# Patient Record
Sex: FEMALE | Race: WHITE | NOT HISPANIC OR LATINO
[De-identification: names, ages, dates, MRNs, and addresses within clinical notes are randomized per-mention and may not be internally consistent; named-entity substitution may affect disease eponyms.]

---

## 2018-02-14 ENCOUNTER — RESULT REVIEW (OUTPATIENT)
Age: 58
End: 2018-02-14

## 2019-02-15 ENCOUNTER — RX RENEWAL (OUTPATIENT)
Age: 59
End: 2019-02-15

## 2019-03-06 ENCOUNTER — RESULT REVIEW (OUTPATIENT)
Age: 59
End: 2019-03-06

## 2019-03-26 ENCOUNTER — RECORD ABSTRACTING (OUTPATIENT)
Age: 59
End: 2019-03-26

## 2019-03-26 DIAGNOSIS — Z80.0 FAMILY HISTORY OF MALIGNANT NEOPLASM OF DIGESTIVE ORGANS: ICD-10-CM

## 2019-03-26 DIAGNOSIS — Z81.8 FAMILY HISTORY OF OTHER MENTAL AND BEHAVIORAL DISORDERS: ICD-10-CM

## 2019-03-26 DIAGNOSIS — Z80.3 FAMILY HISTORY OF MALIGNANT NEOPLASM OF BREAST: ICD-10-CM

## 2019-03-26 DIAGNOSIS — R73.03 PREDIABETES.: ICD-10-CM

## 2019-03-26 RX ORDER — CHLORHEXIDINE GLUCONATE 4 %
325 (65 FE) LIQUID (ML) TOPICAL DAILY
Refills: 0 | Status: ACTIVE | COMMUNITY

## 2019-06-10 ENCOUNTER — NON-APPOINTMENT (OUTPATIENT)
Age: 59
End: 2019-06-10

## 2019-06-10 ENCOUNTER — APPOINTMENT (OUTPATIENT)
Dept: INTERNAL MEDICINE | Facility: CLINIC | Age: 59
End: 2019-06-10
Payer: COMMERCIAL

## 2019-06-10 VITALS
WEIGHT: 162 LBS | BODY MASS INDEX: 30.58 KG/M2 | HEART RATE: 78 BPM | DIASTOLIC BLOOD PRESSURE: 80 MMHG | SYSTOLIC BLOOD PRESSURE: 120 MMHG | HEIGHT: 61 IN | OXYGEN SATURATION: 98 %

## 2019-06-10 PROCEDURE — 93000 ELECTROCARDIOGRAM COMPLETE: CPT

## 2019-06-10 PROCEDURE — 36415 COLL VENOUS BLD VENIPUNCTURE: CPT

## 2019-06-10 PROCEDURE — 99396 PREV VISIT EST AGE 40-64: CPT | Mod: 25

## 2019-06-12 NOTE — HEALTH RISK ASSESSMENT
[No falls in past year] : Patient reported no falls in the past year [0] : 1) Little interest or pleasure doing things: Not at all (0) [Very Good] : ~his/her~  mood as very good [] : No [MammogramDate] : 2019 [PapSmearDate] : 2019 [BoneDensityDate] : few years ago  [ColonoscopyDate] : cologuard

## 2019-06-12 NOTE — HISTORY OF PRESENT ILLNESS
[FreeTextEntry1] : annual  [de-identified] : patient presents to the office today for an annual exam. Patient recently lost her mom and is still grieving.

## 2019-06-17 LAB
25(OH)D3 SERPL-MCNC: 21.7 NG/ML
ALBUMIN SERPL ELPH-MCNC: 4.5 G/DL
ALP BLD-CCNC: 78 U/L
ALT SERPL-CCNC: 30 U/L
ANION GAP SERPL CALC-SCNC: 12 MMOL/L
AST SERPL-CCNC: 22 U/L
BASOPHILS # BLD AUTO: 0.05 K/UL
BASOPHILS NFR BLD AUTO: 0.9 %
BILIRUB SERPL-MCNC: <0.2 MG/DL
BUN SERPL-MCNC: 22 MG/DL
CALCIUM SERPL-MCNC: 9.4 MG/DL
CHLORIDE SERPL-SCNC: 99 MMOL/L
CHOLEST SERPL-MCNC: 215 MG/DL
CHOLEST/HDLC SERPL: 2.9 RATIO
CO2 SERPL-SCNC: 25 MMOL/L
CREAT SERPL-MCNC: 0.66 MG/DL
EOSINOPHIL # BLD AUTO: 0.13 K/UL
EOSINOPHIL NFR BLD AUTO: 2.3 %
ESTIMATED AVERAGE GLUCOSE: 108 MG/DL
GLUCOSE SERPL-MCNC: 87 MG/DL
HBA1C MFR BLD HPLC: 5.4 %
HCT VFR BLD CALC: 41.9 %
HDLC SERPL-MCNC: 74 MG/DL
HGB BLD-MCNC: 13.4 G/DL
IMM GRANULOCYTES NFR BLD AUTO: 0.4 %
LDLC SERPL CALC-MCNC: 123 MG/DL
LYMPHOCYTES # BLD AUTO: 1.03 K/UL
LYMPHOCYTES NFR BLD AUTO: 18.1 %
MAN DIFF?: NORMAL
MCHC RBC-ENTMCNC: 30 PG
MCHC RBC-ENTMCNC: 32 GM/DL
MCV RBC AUTO: 93.9 FL
MONOCYTES # BLD AUTO: 0.41 K/UL
MONOCYTES NFR BLD AUTO: 7.2 %
NEUTROPHILS # BLD AUTO: 4.06 K/UL
NEUTROPHILS NFR BLD AUTO: 71.1 %
OXCARBAZEPINE SERPL-MCNC: 23 UG/ML
PLATELET # BLD AUTO: 241 K/UL
POTASSIUM SERPL-SCNC: 5 MMOL/L
PROT SERPL-MCNC: 6.8 G/DL
RBC # BLD: 4.46 M/UL
RBC # FLD: 12.8 %
SODIUM SERPL-SCNC: 136 MMOL/L
T4 SERPL-MCNC: 5.7 UG/DL
TRIGL SERPL-MCNC: 92 MG/DL
TSH SERPL-ACNC: 0.87 UIU/ML
VIT B12 SERPL-MCNC: 1018 PG/ML
WBC # FLD AUTO: 5.7 K/UL

## 2020-06-17 ENCOUNTER — RESULT REVIEW (OUTPATIENT)
Age: 60
End: 2020-06-17

## 2020-06-19 ENCOUNTER — APPOINTMENT (OUTPATIENT)
Dept: ENDOCRINOLOGY | Facility: CLINIC | Age: 60
End: 2020-06-19
Payer: COMMERCIAL

## 2020-06-19 PROCEDURE — 99203 OFFICE O/P NEW LOW 30 MIN: CPT | Mod: 95

## 2020-06-19 NOTE — HISTORY OF PRESENT ILLNESS
[Home] : at home, [unfilled] , at the time of the visit. [Medical Office: ([facility])___] : at the medical office located in  [Verbal consent obtained from patient] : the patient, [unfilled] [FreeTextEntry1] : Jun 19, 2020    VideoChat  [employer]  Barrie\par \par PCP:  Dr. Milly Childers\par          Gyn:  Dr. Presley\par          GI:  at [facility]\par           Derm:  Samuel Behren for basal cell nose  - Dr. Alexandra   and Dr. Silva\par \par CC:  Multinodular thyroid - euthyroid:       6/2019    TSH 0.87 (on no Rx)\par         (bipolar - Dr. 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        \par \par \par Impression: History of multinodular thyroid, osteopenia.\par \par Plan: Updated US thyroid \par Dr. Presley requested BD.\par \par Call me one week after US.\par ROV 6 months.  \par \par Previous notes from [employer] appended below. \par \par Reason for Appointment \par 1. Euthyroid Multinodular goiter \par 2. Osteopenia \par  \par  \par History of Present Illness \par General:  \par        PMhx:\par        Left retinal hemorrhage with multiple surgeries, Bipolar disorder\par        Was seeing [facility] and found to have multinodular goiter. \par        2/11 US\par        many sub cm thyroid nodules BL. Larges in RUP 8mm and 5mm. 8mm nodule isoechoic with thin halo. Prior US in 2008 similar findings. \par        No CP, SOB, No neck pain, no swelling. Ex smoker. Feb 24,1996. No radiation exposure to neck. No abdominal pain. No Constipation or diarrhea. Hx of fibroids. No hair loss or brittle nails. \par        Family Hx of mother with Graves disease. Mothers cousin with thyroid cancer. Brother does not have thyroid issues\par        Osteopenia Hx: Vitamin D 600iu daily but no longer on. No recent falls or fractures. Oct 2009 Right wrist fracture. No hx of kidney stones. Menapause in early 50's. No hot flashes. \par        2012 Blood work\par        TSH 0.74\par        Vitamin D 27\par        Vitamin B12 860\par        TG 84, Chol 226, , HDL 70\par        CMP. K 4.6, Na 145, Cr 0.6, eGFR >60, LFTs nl. \par        Hgb 13.1, Hct 38. \par  \par Vital Signs \par Ht 62, Wt 166.8, /80, BMI 30.50. \par  \par Examination \par DMS Exam:\par        General Pleasant  F, NAD, Nontoxic. No evidence of abuse or neglect, Not at risk for fall. \par        Head Normocephalic and atraumatic. \par        Eyes PERRL/EOM intact, Conjunctiva and sclera clear without nystagmus. \par        Ears TM's intact and clear with normal canals with grossly normal hearing. \par        Nose No deformity, No discharge, No inflammation, No lesions. \par        Mouth No deformity or lesions with good dentition. \par        Neck No masses, thyromegaly or abnormal cervical codes. \par        Lungs CTA BL. \par        Heart S1S2 RRR no R/M/G. \par        Skin Intact without lesions or rashes. \par  \par  \par Assessments \par 1. Osteopenia - M85.80 (Primary) \par 2. Multinodular goiter - E04.2 \par 3. Vitamin D deficiency - E55.9 \par  \par Treatment \par 1. Osteopenia  \par      LAB: VITAMIN B12 AND FOLATE\par      LAB: CBC\par      LAB: COMPREHENSIVE METABOLIC PANEL\par      LAB: LIPID PANEL\par      LAB: MAGNESIUM\par      LAB: PHOSPHORUS INORGANIC\par      LAB: VITAMIN D 25-HYDROXY\par      LAB: PTH INTACT\par      IMAGING: DEXA\par Notes: Hx of Osteopenia in notes\par Will obtain DEXA scan. Last scan appears to have been in 2538-8637\par Will check CMP, Mag, Phos, PTH, Vitamin D, Vitamin B12 and Folic acid\par Once blood work returns will discuss Vitamin D and calcium replacement.    \par \par 2. Multinodular goiter  \par      LAB: FREE THYROXINE FT4\par      LAB: HEMOGLOBIN A1c\par      LAB: THYROPEROXIDASE AB, SERUM\par      LAB: TSH\par      LAB: THYROGLOBULIN ANTIBODY GROUP\par      IMAGING: US THYROID\par Notes: Multinodular goiter\par Last thyroid US as above\par Will repeat to evaluate nodules\par Will check TSH and Free T4\par Will check TPO and TG abs.    \par  \par  \par

## 2020-06-19 NOTE — ASSESSMENT
[FreeTextEntry1] : Multinodular thyroid - under surveillance \par \par She will go for updated US thyroid at [facility] and ROV to see me in December.\par

## 2020-07-03 ENCOUNTER — TRANSCRIPTION ENCOUNTER (OUTPATIENT)
Age: 60
End: 2020-07-03

## 2020-07-14 ENCOUNTER — NON-APPOINTMENT (OUTPATIENT)
Age: 60
End: 2020-07-14

## 2020-07-14 ENCOUNTER — APPOINTMENT (OUTPATIENT)
Dept: INTERNAL MEDICINE | Facility: CLINIC | Age: 60
End: 2020-07-14
Payer: COMMERCIAL

## 2020-07-14 VITALS
BODY MASS INDEX: 29.07 KG/M2 | DIASTOLIC BLOOD PRESSURE: 82 MMHG | SYSTOLIC BLOOD PRESSURE: 128 MMHG | HEART RATE: 72 BPM | OXYGEN SATURATION: 98 % | HEIGHT: 61 IN | WEIGHT: 154 LBS

## 2020-07-14 PROCEDURE — 99396 PREV VISIT EST AGE 40-64: CPT | Mod: 25

## 2020-07-14 PROCEDURE — 93000 ELECTROCARDIOGRAM COMPLETE: CPT | Mod: 59

## 2020-07-14 PROCEDURE — G0444 DEPRESSION SCREEN ANNUAL: CPT | Mod: NC,59

## 2020-07-14 PROCEDURE — 36415 COLL VENOUS BLD VENIPUNCTURE: CPT

## 2020-07-21 ENCOUNTER — RESULT REVIEW (OUTPATIENT)
Age: 60
End: 2020-07-21

## 2020-07-23 LAB
25(OH)D3 SERPL-MCNC: 42.5 NG/ML
ALBUMIN SERPL ELPH-MCNC: 5 G/DL
ALP BLD-CCNC: 94 U/L
ALT SERPL-CCNC: 22 U/L
ANION GAP SERPL CALC-SCNC: 13 MMOL/L
AST SERPL-CCNC: 18 U/L
BASOPHILS # BLD AUTO: 0.03 K/UL
BASOPHILS NFR BLD AUTO: 0.4 %
BILIRUB SERPL-MCNC: 0.3 MG/DL
BUN SERPL-MCNC: 13 MG/DL
CALCIUM SERPL-MCNC: 9.5 MG/DL
CHLORIDE SERPL-SCNC: 88 MMOL/L
CHOLEST SERPL-MCNC: 267 MG/DL
CHOLEST/HDLC SERPL: 2.9 RATIO
CO2 SERPL-SCNC: 25 MMOL/L
CREAT SERPL-MCNC: 0.59 MG/DL
EOSINOPHIL # BLD AUTO: 0.05 K/UL
EOSINOPHIL NFR BLD AUTO: 0.7 %
ESTIMATED AVERAGE GLUCOSE: 108 MG/DL
GLUCOSE SERPL-MCNC: 104 MG/DL
HBA1C MFR BLD HPLC: 5.4 %
HCT VFR BLD CALC: 44 %
HDLC SERPL-MCNC: 93 MG/DL
HGB BLD-MCNC: 14.2 G/DL
IMM GRANULOCYTES NFR BLD AUTO: 0.3 %
LDLC SERPL CALC-MCNC: 156 MG/DL
LYMPHOCYTES # BLD AUTO: 1.26 K/UL
LYMPHOCYTES NFR BLD AUTO: 18.9 %
MAN DIFF?: NORMAL
MCHC RBC-ENTMCNC: 30.1 PG
MCHC RBC-ENTMCNC: 32.3 GM/DL
MCV RBC AUTO: 93.2 FL
MONOCYTES # BLD AUTO: 0.48 K/UL
MONOCYTES NFR BLD AUTO: 7.2 %
NEUTROPHILS # BLD AUTO: 4.83 K/UL
NEUTROPHILS NFR BLD AUTO: 72.5 %
PLATELET # BLD AUTO: 296 K/UL
POTASSIUM SERPL-SCNC: 4.2 MMOL/L
PROT SERPL-MCNC: 7.6 G/DL
RBC # BLD: 4.72 M/UL
RBC # FLD: 12.4 %
SARS-COV-2 IGG SERPL IA-ACNC: <3.8 AU/ML
SARS-COV-2 IGG SERPL QL IA: NEGATIVE
SODIUM SERPL-SCNC: 126 MMOL/L
T4 SERPL-MCNC: 6.4 UG/DL
TRIGL SERPL-MCNC: 87 MG/DL
TSH SERPL-ACNC: 1.1 UIU/ML
VIT B12 SERPL-MCNC: 1143 PG/ML
WBC # FLD AUTO: 6.67 K/UL

## 2020-07-28 ENCOUNTER — APPOINTMENT (OUTPATIENT)
Dept: INTERNAL MEDICINE | Facility: CLINIC | Age: 60
End: 2020-07-28
Payer: COMMERCIAL

## 2020-07-28 PROCEDURE — 36415 COLL VENOUS BLD VENIPUNCTURE: CPT

## 2020-08-03 LAB
ANION GAP SERPL CALC-SCNC: 14 MMOL/L
BUN SERPL-MCNC: 17 MG/DL
CALCIUM SERPL-MCNC: 9.9 MG/DL
CHLORIDE SERPL-SCNC: 93 MMOL/L
CO2 SERPL-SCNC: 24 MMOL/L
CREAT SERPL-MCNC: 0.68 MG/DL
GLUCOSE SERPL-MCNC: 99 MG/DL
POTASSIUM SERPL-SCNC: 4.6 MMOL/L
SODIUM SERPL-SCNC: 131 MMOL/L

## 2020-08-13 ENCOUNTER — APPOINTMENT (OUTPATIENT)
Dept: GASTROENTEROLOGY | Facility: CLINIC | Age: 60
End: 2020-08-13

## 2020-08-17 ENCOUNTER — APPOINTMENT (OUTPATIENT)
Dept: INTERNAL MEDICINE | Facility: CLINIC | Age: 60
End: 2020-08-17
Payer: COMMERCIAL

## 2020-08-17 VITALS
SYSTOLIC BLOOD PRESSURE: 160 MMHG | BODY MASS INDEX: 29.07 KG/M2 | HEART RATE: 78 BPM | WEIGHT: 154 LBS | OXYGEN SATURATION: 98 % | DIASTOLIC BLOOD PRESSURE: 100 MMHG | HEIGHT: 61 IN

## 2020-08-17 PROCEDURE — 99214 OFFICE O/P EST MOD 30 MIN: CPT | Mod: 25

## 2020-08-17 PROCEDURE — 36415 COLL VENOUS BLD VENIPUNCTURE: CPT

## 2020-08-21 LAB
ANION GAP SERPL CALC-SCNC: 14 MMOL/L
BUN SERPL-MCNC: 16 MG/DL
CALCIUM SERPL-MCNC: 9.4 MG/DL
CHLORIDE SERPL-SCNC: 94 MMOL/L
CO2 SERPL-SCNC: 24 MMOL/L
CREAT SERPL-MCNC: 0.52 MG/DL
GLUCOSE SERPL-MCNC: 117 MG/DL
POTASSIUM SERPL-SCNC: 4.9 MMOL/L
SODIUM SERPL-SCNC: 132 MMOL/L

## 2020-08-25 ENCOUNTER — APPOINTMENT (OUTPATIENT)
Dept: INTERNAL MEDICINE | Facility: CLINIC | Age: 60
End: 2020-08-25
Payer: COMMERCIAL

## 2020-08-25 PROCEDURE — 99214 OFFICE O/P EST MOD 30 MIN: CPT | Mod: 25

## 2020-08-25 PROCEDURE — 36415 COLL VENOUS BLD VENIPUNCTURE: CPT

## 2020-08-26 LAB
ANION GAP SERPL CALC-SCNC: 13 MMOL/L
BUN SERPL-MCNC: 19 MG/DL
CALCIUM SERPL-MCNC: 9.6 MG/DL
CHLORIDE SERPL-SCNC: 96 MMOL/L
CO2 SERPL-SCNC: 23 MMOL/L
CREAT SERPL-MCNC: 0.64 MG/DL
GLUCOSE SERPL-MCNC: 106 MG/DL
POTASSIUM SERPL-SCNC: 4.9 MMOL/L
SODIUM SERPL-SCNC: 132 MMOL/L

## 2020-08-28 ENCOUNTER — APPOINTMENT (OUTPATIENT)
Dept: INTERNAL MEDICINE | Facility: CLINIC | Age: 60
End: 2020-08-28
Payer: COMMERCIAL

## 2020-08-28 ENCOUNTER — NON-APPOINTMENT (OUTPATIENT)
Age: 60
End: 2020-08-28

## 2020-08-28 VITALS
BODY MASS INDEX: 29.07 KG/M2 | SYSTOLIC BLOOD PRESSURE: 160 MMHG | OXYGEN SATURATION: 98 % | HEART RATE: 72 BPM | HEIGHT: 61 IN | WEIGHT: 154 LBS | DIASTOLIC BLOOD PRESSURE: 120 MMHG

## 2020-08-28 DIAGNOSIS — R07.89 OTHER CHEST PAIN: ICD-10-CM

## 2020-08-28 PROCEDURE — 99214 OFFICE O/P EST MOD 30 MIN: CPT | Mod: 25

## 2020-08-28 PROCEDURE — 93000 ELECTROCARDIOGRAM COMPLETE: CPT

## 2020-08-31 PROBLEM — R07.89 MUSCULOSKELETAL CHEST PAIN: Status: ACTIVE | Noted: 2020-08-31

## 2020-09-02 ENCOUNTER — APPOINTMENT (OUTPATIENT)
Dept: CARDIOLOGY | Facility: CLINIC | Age: 60
End: 2020-09-02
Payer: COMMERCIAL

## 2020-09-02 VITALS
WEIGHT: 150 LBS | HEIGHT: 61 IN | SYSTOLIC BLOOD PRESSURE: 148 MMHG | BODY MASS INDEX: 28.32 KG/M2 | HEART RATE: 95 BPM | DIASTOLIC BLOOD PRESSURE: 90 MMHG

## 2020-09-02 PROCEDURE — 99203 OFFICE O/P NEW LOW 30 MIN: CPT

## 2020-09-02 RX ORDER — NYSTATIN 100000 [USP'U]/G
100000 CREAM TOPICAL
Qty: 15 | Refills: 1 | Status: DISCONTINUED | COMMUNITY
Start: 2019-02-15 | End: 2020-09-02

## 2020-09-02 RX ORDER — FLUTICASONE PROPIONATE 50 UG/1
50 SPRAY, METERED NASAL
Refills: 0 | Status: DISCONTINUED | COMMUNITY
End: 2020-09-02

## 2020-09-02 RX ORDER — DESONIDE 0.5 MG/G
0.05 CREAM TOPICAL
Qty: 1 | Refills: 0 | Status: DISCONTINUED | COMMUNITY
Start: 2019-02-15 | End: 2020-09-02

## 2020-09-08 ENCOUNTER — APPOINTMENT (OUTPATIENT)
Dept: INTERNAL MEDICINE | Facility: CLINIC | Age: 60
End: 2020-09-08
Payer: COMMERCIAL

## 2020-09-08 VITALS
HEART RATE: 78 BPM | OXYGEN SATURATION: 98 % | DIASTOLIC BLOOD PRESSURE: 90 MMHG | WEIGHT: 150 LBS | SYSTOLIC BLOOD PRESSURE: 140 MMHG | BODY MASS INDEX: 28.32 KG/M2 | HEIGHT: 61 IN

## 2020-09-08 PROCEDURE — 36415 COLL VENOUS BLD VENIPUNCTURE: CPT

## 2020-09-08 PROCEDURE — 99214 OFFICE O/P EST MOD 30 MIN: CPT | Mod: 25

## 2020-09-09 LAB
ALBUMIN SERPL ELPH-MCNC: 4.7 G/DL
ALP BLD-CCNC: 92 U/L
ALT SERPL-CCNC: 24 U/L
ANION GAP SERPL CALC-SCNC: 14 MMOL/L
AST SERPL-CCNC: 17 U/L
BILIRUB SERPL-MCNC: 0.2 MG/DL
BUN SERPL-MCNC: 14 MG/DL
CALCIUM SERPL-MCNC: 9.7 MG/DL
CHLORIDE SERPL-SCNC: 94 MMOL/L
CO2 SERPL-SCNC: 23 MMOL/L
CREAT SERPL-MCNC: 0.57 MG/DL
GLUCOSE SERPL-MCNC: 118 MG/DL
POTASSIUM SERPL-SCNC: 4.2 MMOL/L
PROT SERPL-MCNC: 7.2 G/DL
SODIUM SERPL-SCNC: 132 MMOL/L

## 2020-10-16 ENCOUNTER — RX RENEWAL (OUTPATIENT)
Age: 60
End: 2020-10-16

## 2020-11-11 ENCOUNTER — APPOINTMENT (OUTPATIENT)
Dept: ENDOCRINOLOGY | Facility: CLINIC | Age: 60
End: 2020-11-11
Payer: COMMERCIAL

## 2020-11-11 DIAGNOSIS — M85.80 OTHER SPECIFIED DISORDERS OF BONE DENSITY AND STRUCTURE, UNSPECIFIED SITE: ICD-10-CM

## 2020-11-11 PROCEDURE — 99214 OFFICE O/P EST MOD 30 MIN: CPT | Mod: 95

## 2020-11-11 NOTE — HISTORY OF PRESENT ILLNESS
[Home] : at home, [unfilled] , at the time of the visit. [Medical Office: ([facility])___] : at the medical office located in  [Verbal consent obtained from patient] : the patient, [unfilled] [FreeTextEntry1] : Nov 11, 2020     VideoChat  iPhone  Facetime\par \par PCP:  Dr. Milly Childers\par          Gyn:  Dr. Presley\par          GI:  at [facility]\par           Derm:  Samuel Behren for basal cell nose  - Dr. Alexandra   and Dr. Silva\par \par CC:  Multinodular thyroid - euthyroid:       6/2019    TSH 0.87 (on no Rx)\par         (bipolar - Dr. Rogers in )\par          (25 OH vit D 21.7  6/2019 - takes 3000 iu daily)\par \par \par 61 yo to follow up on multinodular thyroid.  \par Most recent labs at [facility] on September 9 included\par BUN 14\par creatinine 0.57\par calcium 9.7\par sodium 132\par potassium 4.2\par LFTs - wnl\par \par July 14 labs included\par T4 6.4\par TSH 1.0\par 25 OH vitamin D 42.5\par \par Ultrasound of thyroid at [facility] on\par 7/21/20 showed "mildly heterogeneous nodular and cystic thyroid gland....Recommend one year... follow up...." as read by Dr. Valdez.   \par \par Impression:  She feels well.   Multinodular thyroid under survillance\par Plan:  Last bone density 6/2017 so she will be eligible to go for a follow up.\par She will probably go for another ultrasound of the thyroid in late July as advised by Radiology.\par When she next has early morning blood tests, I will ask her to have\par BMP\par serum cortisol\par ACTH\par \par ROV in six months.\par \par \par Jun 19, 2020    VideoChat  iPhone  Facetime\par \par PCP:  Dr. Milly Childers\par          Gyn:  Dr. Presley\par          GI:  at [facility]\par           Derm:  Samuel Behren for basal cell nose  - Dr. Alexandra   and Dr. Silva\par \par CC:  Multinodular thyroid - euthyroid:       6/2019    TSH 0.87 (on no Rx)\par         (bipolar - Dr. 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        \par \par \par Impression: History of multinodular thyroid, osteopenia.\par \par Plan: Updated US thyroid \par Dr. Presley requested BD.\par \par Call me one week after US.\par ROV 6 months.  \par \par Previous notes from [employer] appended below. \par \par Reason for Appointment \par 1. Euthyroid Multinodular goiter \par 2. Osteopenia \par  \par  \par History of Present Illness \par General:  \par        PMhx:\par        Left retinal hemorrhage with multiple surgeries, Bipolar disorder\par        Was seeing [facility] and found to have multinodular goiter. \par        2/11 US\par        many sub cm thyroid nodules BL. Larges in RUP 8mm and 5mm. 8mm nodule isoechoic with thin halo. Prior US in 2008 similar findings. \par        No CP, SOB, No neck pain, no swelling. Ex smoker. Feb 24,1996. No radiation exposure to neck. No abdominal pain. No Constipation or diarrhea. Hx of fibroids. No hair loss or brittle nails. \par        Family Hx of mother with Graves disease. Mothers cousin with thyroid cancer. Brother does not have thyroid issues\par        Osteopenia Hx: Vitamin D 600iu daily but no longer on. No recent falls or fractures. Oct 2009 Right wrist fracture. No hx of kidney stones. Menapause in early 50's. No hot flashes. \par        2012 Blood work\par        TSH 0.74\par        Vitamin D 27\par        Vitamin B12 860\par        TG 84, Chol 226, , HDL 70\par        CMP. K 4.6, Na 145, Cr 0.6, eGFR >60, LFTs nl. \par        Hgb 13.1, Hct 38. \par  \par Vital Signs \par Ht 62, Wt 166.8, /80, BMI 30.50. \par  \par Examination \par DMS Exam:\par        General Pleasant  F, NAD, Nontoxic. No evidence of abuse or neglect, Not at risk for fall. \par        Head Normocephalic and atraumatic. \par        Eyes PERRL/EOM intact, Conjunctiva and sclera clear without nystagmus. \par        Ears TM's intact and clear with normal canals with grossly normal hearing. \par        Nose No deformity, No discharge, No inflammation, No lesions. \par        Mouth No deformity or lesions with good dentition. \par        Neck No masses, thyromegaly or abnormal cervical codes. \par        Lungs CTA BL. \par        Heart S1S2 RRR no R/M/G. \par        Skin Intact without lesions or rashes. \par  \par  \par Assessments \par 1. Osteopenia - M85.80 (Primary) \par 2. Multinodular goiter - E04.2 \par 3. Vitamin D deficiency - E55.9 \par  \par Treatment \par 1. Osteopenia  \par      LAB: VITAMIN B12 AND FOLATE\par      LAB: CBC\par      LAB: COMPREHENSIVE METABOLIC PANEL\par      LAB: LIPID PANEL\par      LAB: MAGNESIUM\par      LAB: PHOSPHORUS INORGANIC\par      LAB: VITAMIN D 25-HYDROXY\par      LAB: PTH INTACT\par      IMAGING: DEXA\par Notes: Hx of Osteopenia in notes\par Will obtain DEXA scan. Last scan appears to have been in 2056-8992\par Will check CMP, Mag, Phos, PTH, Vitamin D, Vitamin B12 and Folic acid\par Once blood work returns will discuss Vitamin D and calcium replacement.    \par \par 2. Multinodular goiter  \par      LAB: FREE THYROXINE FT4\par      LAB: HEMOGLOBIN A1c\par      LAB: THYROPEROXIDASE AB, SERUM\par      LAB: TSH\par      LAB: THYROGLOBULIN ANTIBODY GROUP\par      IMAGING: US THYROID\par Notes: Multinodular goiter\par Last thyroid US as above\par Will repeat to evaluate nodules\par Will check TSH and Free T4\par Will check TPO and TG abs.    \par  \par  \par

## 2020-11-11 NOTE — ASSESSMENT
[FreeTextEntry1] : Feels well\par Doing well\par Multinodular thyroid under surveillance\par TFTs in good range\par Eligible for follow up bone density.\par ROV in six months ~May.    May aim for next US thyroid and bone density at the same time\par at [facility].

## 2020-12-01 ENCOUNTER — APPOINTMENT (OUTPATIENT)
Dept: INTERNAL MEDICINE | Facility: CLINIC | Age: 60
End: 2020-12-01
Payer: COMMERCIAL

## 2020-12-01 VITALS
DIASTOLIC BLOOD PRESSURE: 100 MMHG | HEIGHT: 61 IN | WEIGHT: 150 LBS | HEART RATE: 72 BPM | BODY MASS INDEX: 28.32 KG/M2 | OXYGEN SATURATION: 98 % | SYSTOLIC BLOOD PRESSURE: 140 MMHG

## 2020-12-01 PROCEDURE — 36415 COLL VENOUS BLD VENIPUNCTURE: CPT

## 2020-12-01 PROCEDURE — 99072 ADDL SUPL MATRL&STAF TM PHE: CPT

## 2020-12-01 PROCEDURE — 99214 OFFICE O/P EST MOD 30 MIN: CPT | Mod: 25

## 2020-12-03 LAB
ALBUMIN SERPL ELPH-MCNC: 4.8 G/DL
ALP BLD-CCNC: 87 U/L
ALT SERPL-CCNC: 22 U/L
ANION GAP SERPL CALC-SCNC: 10 MMOL/L
AST SERPL-CCNC: 18 U/L
BASOPHILS # BLD AUTO: 0.04 K/UL
BASOPHILS NFR BLD AUTO: 0.8 %
BILIRUB SERPL-MCNC: 0.3 MG/DL
BUN SERPL-MCNC: 14 MG/DL
CALCIUM SERPL-MCNC: 9.7 MG/DL
CALCIUM SERPL-MCNC: 9.7 MG/DL
CHLORIDE SERPL-SCNC: 94 MMOL/L
CHOLEST SERPL-MCNC: 239 MG/DL
CO2 SERPL-SCNC: 27 MMOL/L
CREAT SERPL-MCNC: 0.56 MG/DL
EOSINOPHIL # BLD AUTO: 0.06 K/UL
EOSINOPHIL NFR BLD AUTO: 1.3 %
ESTIMATED AVERAGE GLUCOSE: 111 MG/DL
GLUCOSE SERPL-MCNC: 112 MG/DL
HBA1C MFR BLD HPLC: 5.5 %
HCT VFR BLD CALC: 41.7 %
HDLC SERPL-MCNC: 94 MG/DL
HGB BLD-MCNC: 13.5 G/DL
IMM GRANULOCYTES NFR BLD AUTO: 0.2 %
LDLC SERPL CALC-MCNC: 119 MG/DL
LYMPHOCYTES # BLD AUTO: 0.97 K/UL
LYMPHOCYTES NFR BLD AUTO: 20.2 %
MAGNESIUM SERPL-MCNC: 2.1 MG/DL
MAN DIFF?: NORMAL
MCHC RBC-ENTMCNC: 30.6 PG
MCHC RBC-ENTMCNC: 32.4 GM/DL
MCV RBC AUTO: 94.6 FL
MONOCYTES # BLD AUTO: 0.32 K/UL
MONOCYTES NFR BLD AUTO: 6.7 %
NEUTROPHILS # BLD AUTO: 3.4 K/UL
NEUTROPHILS NFR BLD AUTO: 70.8 %
NONHDLC SERPL-MCNC: 145 MG/DL
PARATHYROID HORMONE INTACT: 36 PG/ML
PHOSPHATE SERPL-MCNC: 2.9 MG/DL
PLATELET # BLD AUTO: 301 K/UL
POTASSIUM SERPL-SCNC: 4.9 MMOL/L
PROT SERPL-MCNC: 6.8 G/DL
RBC # BLD: 4.41 M/UL
RBC # FLD: 12 %
SODIUM SERPL-SCNC: 131 MMOL/L
T3 SERPL-MCNC: 88 NG/DL
T3FREE SERPL-MCNC: 2.69 PG/ML
T4 FREE SERPL-MCNC: 1.2 NG/DL
T4 SERPL-MCNC: 7 UG/DL
THYROGLOB AB SERPL-ACNC: <20 IU/ML
THYROPEROXIDASE AB SERPL IA-ACNC: <10 IU/ML
TRIGL SERPL-MCNC: 128 MG/DL
TSH SERPL-ACNC: 0.74 UIU/ML
WBC # FLD AUTO: 4.8 K/UL

## 2020-12-15 ENCOUNTER — APPOINTMENT (OUTPATIENT)
Dept: INTERNAL MEDICINE | Facility: CLINIC | Age: 60
End: 2020-12-15
Payer: COMMERCIAL

## 2020-12-15 VITALS
DIASTOLIC BLOOD PRESSURE: 100 MMHG | BODY MASS INDEX: 28.32 KG/M2 | WEIGHT: 150 LBS | HEIGHT: 61 IN | SYSTOLIC BLOOD PRESSURE: 140 MMHG | OXYGEN SATURATION: 98 % | HEART RATE: 78 BPM

## 2020-12-15 DIAGNOSIS — R07.89 OTHER CHEST PAIN: ICD-10-CM

## 2020-12-15 PROCEDURE — 99214 OFFICE O/P EST MOD 30 MIN: CPT

## 2020-12-15 PROCEDURE — 99072 ADDL SUPL MATRL&STAF TM PHE: CPT

## 2020-12-16 PROBLEM — R07.89 CHEST DISCOMFORT: Status: ACTIVE | Noted: 2020-09-02

## 2020-12-16 NOTE — HISTORY OF PRESENT ILLNESS
[FreeTextEntry1] : bp check  [de-identified] : patient is here for BP check, BP levels are still elevated. However patient reports being extremely anxious

## 2021-01-05 ENCOUNTER — APPOINTMENT (OUTPATIENT)
Dept: INTERNAL MEDICINE | Facility: CLINIC | Age: 61
End: 2021-01-05
Payer: COMMERCIAL

## 2021-01-05 VITALS
HEART RATE: 100 BPM | HEIGHT: 61 IN | DIASTOLIC BLOOD PRESSURE: 100 MMHG | OXYGEN SATURATION: 99 % | SYSTOLIC BLOOD PRESSURE: 140 MMHG | WEIGHT: 150 LBS | BODY MASS INDEX: 28.32 KG/M2

## 2021-01-05 PROCEDURE — 99214 OFFICE O/P EST MOD 30 MIN: CPT

## 2021-01-05 PROCEDURE — 99072 ADDL SUPL MATRL&STAF TM PHE: CPT

## 2021-01-05 RX ORDER — AMLODIPINE BESYLATE 2.5 MG/1
2.5 TABLET ORAL
Qty: 90 | Refills: 2 | Status: DISCONTINUED | COMMUNITY
Start: 2020-12-15 | End: 2021-01-05

## 2021-01-05 RX ORDER — AMLODIPINE BESYLATE 5 MG/1
5 TABLET ORAL
Qty: 90 | Refills: 0 | Status: DISCONTINUED | COMMUNITY
Start: 2020-08-28 | End: 2021-01-05

## 2021-04-06 ENCOUNTER — APPOINTMENT (OUTPATIENT)
Dept: INTERNAL MEDICINE | Facility: CLINIC | Age: 61
End: 2021-04-06
Payer: COMMERCIAL

## 2021-04-06 VITALS
SYSTOLIC BLOOD PRESSURE: 122 MMHG | HEART RATE: 76 BPM | BODY MASS INDEX: 28.32 KG/M2 | OXYGEN SATURATION: 99 % | HEIGHT: 61 IN | WEIGHT: 150 LBS | DIASTOLIC BLOOD PRESSURE: 88 MMHG

## 2021-04-06 PROCEDURE — 99072 ADDL SUPL MATRL&STAF TM PHE: CPT

## 2021-04-06 PROCEDURE — 99214 OFFICE O/P EST MOD 30 MIN: CPT

## 2021-04-13 LAB — TSI ACT/NOR SER: <0.1 IU/L

## 2021-06-30 ENCOUNTER — RESULT REVIEW (OUTPATIENT)
Age: 61
End: 2021-06-30

## 2021-08-25 ENCOUNTER — APPOINTMENT (OUTPATIENT)
Dept: INTERNAL MEDICINE | Facility: CLINIC | Age: 61
End: 2021-08-25
Payer: COMMERCIAL

## 2021-08-25 VITALS
WEIGHT: 142.8 LBS | BODY MASS INDEX: 26.96 KG/M2 | SYSTOLIC BLOOD PRESSURE: 130 MMHG | HEIGHT: 61 IN | DIASTOLIC BLOOD PRESSURE: 78 MMHG | OXYGEN SATURATION: 98 % | HEART RATE: 82 BPM

## 2021-08-25 DIAGNOSIS — Z86.39 PERSONAL HISTORY OF OTHER ENDOCRINE, NUTRITIONAL AND METABOLIC DISEASE: ICD-10-CM

## 2021-08-25 PROCEDURE — 99214 OFFICE O/P EST MOD 30 MIN: CPT | Mod: 25

## 2021-08-25 PROCEDURE — 36415 COLL VENOUS BLD VENIPUNCTURE: CPT

## 2021-09-09 LAB
25(OH)D3 SERPL-MCNC: 30.9 NG/ML
ALBUMIN SERPL ELPH-MCNC: 5 G/DL
ALP BLD-CCNC: 78 U/L
ALT SERPL-CCNC: 26 U/L
ANION GAP SERPL CALC-SCNC: 13 MMOL/L
AST SERPL-CCNC: 17 U/L
BASOPHILS # BLD AUTO: 0.03 K/UL
BASOPHILS NFR BLD AUTO: 0.5 %
BILIRUB SERPL-MCNC: 0.4 MG/DL
BUN SERPL-MCNC: 13 MG/DL
CALCIUM SERPL-MCNC: 9.9 MG/DL
CHLORIDE SERPL-SCNC: 92 MMOL/L
CHOLEST SERPL-MCNC: 290 MG/DL
CO2 SERPL-SCNC: 24 MMOL/L
CREAT SERPL-MCNC: 0.59 MG/DL
EOSINOPHIL # BLD AUTO: 0.03 K/UL
EOSINOPHIL NFR BLD AUTO: 0.5 %
ESTIMATED AVERAGE GLUCOSE: 108 MG/DL
GLUCOSE SERPL-MCNC: 112 MG/DL
HBA1C MFR BLD HPLC: 5.4 %
HCT VFR BLD CALC: 44.3 %
HDLC SERPL-MCNC: 111 MG/DL
HGB BLD-MCNC: 14.8 G/DL
IMM GRANULOCYTES NFR BLD AUTO: 0.4 %
LDLC SERPL CALC-MCNC: 162 MG/DL
LYMPHOCYTES # BLD AUTO: 1.08 K/UL
LYMPHOCYTES NFR BLD AUTO: 19.7 %
MAN DIFF?: NORMAL
MCHC RBC-ENTMCNC: 30.9 PG
MCHC RBC-ENTMCNC: 33.4 GM/DL
MCV RBC AUTO: 92.5 FL
MONOCYTES # BLD AUTO: 0.35 K/UL
MONOCYTES NFR BLD AUTO: 6.4 %
NEUTROPHILS # BLD AUTO: 3.97 K/UL
NEUTROPHILS NFR BLD AUTO: 72.5 %
NONHDLC SERPL-MCNC: 180 MG/DL
PLATELET # BLD AUTO: 308 K/UL
POTASSIUM SERPL-SCNC: 4.9 MMOL/L
PROT SERPL-MCNC: 7.6 G/DL
RBC # BLD: 4.79 M/UL
RBC # FLD: 11.9 %
SODIUM SERPL-SCNC: 129 MMOL/L
T4 SERPL-MCNC: 6.9 UG/DL
TRIGL SERPL-MCNC: 88 MG/DL
TSH SERPL-ACNC: 0.66 UIU/ML
VIT B12 SERPL-MCNC: 1202 PG/ML
WBC # FLD AUTO: 5.48 K/UL

## 2021-10-19 ENCOUNTER — RX RENEWAL (OUTPATIENT)
Age: 61
End: 2021-10-19

## 2021-11-26 ENCOUNTER — NON-APPOINTMENT (OUTPATIENT)
Age: 61
End: 2021-11-26

## 2021-12-01 ENCOUNTER — APPOINTMENT (OUTPATIENT)
Dept: INTERNAL MEDICINE | Facility: CLINIC | Age: 61
End: 2021-12-01
Payer: COMMERCIAL

## 2021-12-01 VITALS
BODY MASS INDEX: 26.62 KG/M2 | WEIGHT: 141 LBS | OXYGEN SATURATION: 99 % | DIASTOLIC BLOOD PRESSURE: 78 MMHG | SYSTOLIC BLOOD PRESSURE: 110 MMHG | HEART RATE: 76 BPM | HEIGHT: 61 IN

## 2021-12-01 PROCEDURE — 99214 OFFICE O/P EST MOD 30 MIN: CPT | Mod: 25

## 2021-12-01 PROCEDURE — 36415 COLL VENOUS BLD VENIPUNCTURE: CPT

## 2021-12-01 RX ORDER — OXCARBAZEPINE 600 MG/1
TABLET, FILM COATED ORAL
Refills: 0 | Status: DISCONTINUED | COMMUNITY
End: 2021-12-01

## 2021-12-01 RX ORDER — LAMOTRIGINE 200 MG/1
200 TABLET ORAL
Qty: 180 | Refills: 0 | Status: ACTIVE | COMMUNITY
Start: 2021-12-01

## 2021-12-02 NOTE — HISTORY OF PRESENT ILLNESS
[FreeTextEntry1] : 1. recheck sodium, cholesterol [de-identified] : 1. recheck sodium \par 2. recheck cholesterol\par 3. update medications\par 4. check blood pressure

## 2021-12-07 ENCOUNTER — RX RENEWAL (OUTPATIENT)
Age: 61
End: 2021-12-07

## 2021-12-07 LAB
ALBUMIN SERPL ELPH-MCNC: 4.7 G/DL
ALP BLD-CCNC: 82 U/L
ALT SERPL-CCNC: 21 U/L
ANION GAP SERPL CALC-SCNC: 13 MMOL/L
AST SERPL-CCNC: 17 U/L
BILIRUB SERPL-MCNC: 0.4 MG/DL
BUN SERPL-MCNC: 17 MG/DL
CALCIUM SERPL-MCNC: 9.5 MG/DL
CHLORIDE SERPL-SCNC: 102 MMOL/L
CHOLEST SERPL-MCNC: 231 MG/DL
CO2 SERPL-SCNC: 25 MMOL/L
CREAT SERPL-MCNC: 0.68 MG/DL
GLUCOSE SERPL-MCNC: 99 MG/DL
HDLC SERPL-MCNC: 82 MG/DL
LDLC SERPL CALC-MCNC: 133 MG/DL
NONHDLC SERPL-MCNC: 149 MG/DL
POTASSIUM SERPL-SCNC: 3.8 MMOL/L
PROT SERPL-MCNC: 7.3 G/DL
SODIUM SERPL-SCNC: 141 MMOL/L
TRIGL SERPL-MCNC: 78 MG/DL

## 2021-12-08 RX ORDER — ZOSTER VACCINE RECOMBINANT, ADJUVANTED 50 MCG/0.5
50 KIT INTRAMUSCULAR
Qty: 1 | Refills: 2 | Status: ACTIVE | COMMUNITY
Start: 2021-12-08 | End: 1900-01-01

## 2022-03-16 ENCOUNTER — APPOINTMENT (OUTPATIENT)
Dept: INTERNAL MEDICINE | Facility: CLINIC | Age: 62
End: 2022-03-16
Payer: COMMERCIAL

## 2022-03-16 VITALS
WEIGHT: 145 LBS | TEMPERATURE: 97.6 F | HEIGHT: 61 IN | OXYGEN SATURATION: 99 % | DIASTOLIC BLOOD PRESSURE: 80 MMHG | BODY MASS INDEX: 27.38 KG/M2 | HEART RATE: 106 BPM | SYSTOLIC BLOOD PRESSURE: 118 MMHG

## 2022-03-16 PROCEDURE — 99214 OFFICE O/P EST MOD 30 MIN: CPT

## 2022-03-22 NOTE — HISTORY OF PRESENT ILLNESS
[FreeTextEntry1] : check bp [de-identified] : 1. recheck sodium \par 2. recheck cholesterol\par 3. update medications\par 4. check blood pressure

## 2022-03-22 NOTE — PHYSICAL EXAM
[No Acute Distress] : no acute distress [Well Nourished] : well nourished [Well Developed] : well developed [Well-Appearing] : well-appearing [Normal Sclera/Conjunctiva] : normal sclera/conjunctiva [PERRL] : pupils equal round and reactive to light [EOMI] : extraocular movements intact [Normal Outer Ear/Nose] : the outer ears and nose were normal in appearance [Normal Oropharynx] : the oropharynx was normal [No JVD] : no jugular venous distention [No Lymphadenopathy] : no lymphadenopathy [Supple] : supple [Thyroid Normal, No Nodules] : the thyroid was normal and there were no nodules present [No Respiratory Distress] : no respiratory distress  [No Accessory Muscle Use] : no accessory muscle use [Normal Rate] : normal rate  [Clear to Auscultation] : lungs were clear to auscultation bilaterally [Normal S1, S2] : normal S1 and S2 [Regular Rhythm] : with a regular rhythm [No Murmur] : no murmur heard [No Carotid Bruits] : no carotid bruits [No Abdominal Bruit] : a ~M bruit was not heard ~T in the abdomen [No Varicosities] : no varicosities [Pedal Pulses Present] : the pedal pulses are present [No Edema] : there was no peripheral edema [No Palpable Aorta] : no palpable aorta [No Extremity Clubbing/Cyanosis] : no extremity clubbing/cyanosis [Soft] : abdomen soft [Non Tender] : non-tender [Non-distended] : non-distended [No Masses] : no abdominal mass palpated [No HSM] : no HSM [Normal Bowel Sounds] : normal bowel sounds [Normal Posterior Cervical Nodes] : no posterior cervical lymphadenopathy [Normal Anterior Cervical Nodes] : no anterior cervical lymphadenopathy [No CVA Tenderness] : no CVA  tenderness [No Spinal Tenderness] : no spinal tenderness [No Joint Swelling] : no joint swelling [Grossly Normal Strength/Tone] : grossly normal strength/tone [No Rash] : no rash [Coordination Grossly Intact] : coordination grossly intact [No Focal Deficits] : no focal deficits [Normal Gait] : normal gait [Deep Tendon Reflexes (DTR)] : deep tendon reflexes were 2+ and symmetric [Normal Affect] : the affect was normal [Normal Insight/Judgement] : insight and judgment were intact

## 2022-03-25 LAB
ALBUMIN SERPL ELPH-MCNC: 5.2 G/DL
ALP BLD-CCNC: 91 U/L
ALT SERPL-CCNC: 20 U/L
ANION GAP SERPL CALC-SCNC: 15 MMOL/L
AST SERPL-CCNC: 17 U/L
BILIRUB SERPL-MCNC: 0.4 MG/DL
BUN SERPL-MCNC: 16 MG/DL
CALCIUM SERPL-MCNC: 10.4 MG/DL
CHLORIDE SERPL-SCNC: 101 MMOL/L
CHOLEST SERPL-MCNC: 269 MG/DL
CO2 SERPL-SCNC: 24 MMOL/L
CREAT SERPL-MCNC: 0.75 MG/DL
EGFR: 90 ML/MIN/1.73M2
GLUCOSE SERPL-MCNC: 129 MG/DL
HDLC SERPL-MCNC: 103 MG/DL
LDLC SERPL CALC-MCNC: 151 MG/DL
NONHDLC SERPL-MCNC: 165 MG/DL
POTASSIUM SERPL-SCNC: 4.8 MMOL/L
PROT SERPL-MCNC: 8 G/DL
SODIUM SERPL-SCNC: 141 MMOL/L
TRIGL SERPL-MCNC: 73 MG/DL

## 2022-04-19 ENCOUNTER — RX RENEWAL (OUTPATIENT)
Age: 62
End: 2022-04-19

## 2022-06-15 ENCOUNTER — RX RENEWAL (OUTPATIENT)
Age: 62
End: 2022-06-15

## 2022-06-16 ENCOUNTER — APPOINTMENT (OUTPATIENT)
Dept: INTERNAL MEDICINE | Facility: CLINIC | Age: 62
End: 2022-06-16
Payer: COMMERCIAL

## 2022-06-16 VITALS
DIASTOLIC BLOOD PRESSURE: 68 MMHG | WEIGHT: 145 LBS | OXYGEN SATURATION: 98 % | HEIGHT: 61 IN | BODY MASS INDEX: 27.38 KG/M2 | SYSTOLIC BLOOD PRESSURE: 110 MMHG

## 2022-06-16 PROCEDURE — 99214 OFFICE O/P EST MOD 30 MIN: CPT

## 2022-06-16 RX ORDER — CARIPRAZINE 6 MG/1
CAPSULE, GELATIN COATED ORAL DAILY
Qty: 90 | Refills: 3 | Status: DISCONTINUED | COMMUNITY
End: 2022-06-16

## 2022-06-16 RX ORDER — LORAZEPAM 1 MG/1
1 TABLET ORAL
Qty: 90 | Refills: 1 | Status: DISCONTINUED | COMMUNITY
End: 2022-06-16

## 2022-06-17 NOTE — HISTORY OF PRESENT ILLNESS
[FreeTextEntry1] : follow up exam  [de-identified] : 1. change of medications\par 2. repeat cholesterol

## 2022-06-22 LAB
ALBUMIN SERPL ELPH-MCNC: 4.9 G/DL
ALP BLD-CCNC: 89 U/L
ALT SERPL-CCNC: 22 U/L
ANION GAP SERPL CALC-SCNC: 11 MMOL/L
AST SERPL-CCNC: 20 U/L
BILIRUB SERPL-MCNC: 0.3 MG/DL
BUN SERPL-MCNC: 17 MG/DL
CALCIUM SERPL-MCNC: 9.8 MG/DL
CHLORIDE SERPL-SCNC: 101 MMOL/L
CHOLEST SERPL-MCNC: 204 MG/DL
CO2 SERPL-SCNC: 28 MMOL/L
CREAT SERPL-MCNC: 0.73 MG/DL
EGFR: 93 ML/MIN/1.73M2
ESTIMATED AVERAGE GLUCOSE: 108 MG/DL
GLUCOSE SERPL-MCNC: 98 MG/DL
HBA1C MFR BLD HPLC: 5.4 %
HDLC SERPL-MCNC: 72 MG/DL
LDLC SERPL CALC-MCNC: 110 MG/DL
NONHDLC SERPL-MCNC: 131 MG/DL
POTASSIUM SERPL-SCNC: 5 MMOL/L
PROT SERPL-MCNC: 7.4 G/DL
SODIUM SERPL-SCNC: 140 MMOL/L
TRIGL SERPL-MCNC: 106 MG/DL

## 2022-06-27 ENCOUNTER — APPOINTMENT (OUTPATIENT)
Dept: GASTROENTEROLOGY | Facility: CLINIC | Age: 62
End: 2022-06-27

## 2022-06-27 PROCEDURE — 99202 OFFICE O/P NEW SF 15 MIN: CPT | Mod: 95

## 2022-06-27 NOTE — PHYSICAL EXAM
[General Appearance - Alert] : alert [General Appearance - In No Acute Distress] : in no acute distress [Sclera] : the sclera and conjunctiva were normal [Hearing Threshold Finger Rub Not Craven] : hearing was normal [Neck Appearance] : the appearance of the neck was normal [] : no respiratory distress [Abnormal Walk] : normal gait [Skin Color & Pigmentation] : normal skin color and pigmentation [Oriented To Time, Place, And Person] : oriented to person, place, and time

## 2022-06-28 NOTE — ASSESSMENT
[FreeTextEntry1] : Screening colonoscopy\par Risks (including but not limited to sedation risks, infection, bleeding, perforation, possibility of missed lesions), benefits and alternatives to procedure, including not doing the procedure, were discussed with patient. Patient understood and agreed to proceed with colonoscopy. \par Colonoscopy preparation instructions reviewed with patient.\par 2 Dulcolax two days prior to procedure + Split MiraLAX/Dulcolax preparation \par

## 2022-06-28 NOTE — HISTORY OF PRESENT ILLNESS
[Home] : at home, [unfilled] , at the time of the visit. [Medical Office: ([facility])___] : at the medical office located in  [Verbal consent obtained from patient] : the patient, [unfilled] [FreeTextEntry1] : 61 year old F htn, hld, osteopenia, BPD, presents for evaluation of colon cancer screening.\par She is seen at the request of Dr. Milly Childers\par \par 1st colonoscopy, 12 years ago- normal. told to repeat in 10 years. \par \par bm every other day- every 2 days\par takesfiber pills \par \par Patient denies abdominal pain , n/v/heartburn, reflux, dysphagia/odynophagia, change in bowel habits, diarrhea, constipation, brbpr, melena. no weight loss.  Good appetite. denies regular NSAID use. \par \par weaning of imipramine\par \par fam hx- \par negative for colon polyps, colon cancer\par father- esophageal and stomach, eotoh and smoking\par \par mother - breast ca

## 2022-07-06 ENCOUNTER — RESULT REVIEW (OUTPATIENT)
Age: 62
End: 2022-07-06

## 2022-09-14 ENCOUNTER — APPOINTMENT (OUTPATIENT)
Dept: INTERNAL MEDICINE | Facility: CLINIC | Age: 62
End: 2022-09-14

## 2022-09-14 VITALS
OXYGEN SATURATION: 98 % | DIASTOLIC BLOOD PRESSURE: 80 MMHG | HEART RATE: 72 BPM | SYSTOLIC BLOOD PRESSURE: 120 MMHG | BODY MASS INDEX: 27.38 KG/M2 | HEIGHT: 61 IN | WEIGHT: 145 LBS

## 2022-09-14 PROCEDURE — 36415 COLL VENOUS BLD VENIPUNCTURE: CPT

## 2022-09-14 PROCEDURE — 99213 OFFICE O/P EST LOW 20 MIN: CPT | Mod: 25

## 2022-09-15 NOTE — HEALTH RISK ASSESSMENT
[0] : 2) Feeling down, depressed, or hopeless: Not at all (0) [PHQ-2 Negative - No further assessment needed] : PHQ-2 Negative - No further assessment needed [[id number]] : 0

## 2022-09-15 NOTE — HISTORY OF PRESENT ILLNESS
[FreeTextEntry1] : recheck cholesterol [de-identified] : Patient presents to the office to recheck cholesterol levels as well as recheck her blood pressure.  She has no other complaints at this time and reports feeling well.

## 2022-09-20 ENCOUNTER — RX RENEWAL (OUTPATIENT)
Age: 62
End: 2022-09-20

## 2022-09-23 LAB
ALBUMIN SERPL ELPH-MCNC: 4.7 G/DL
ALP BLD-CCNC: 75 U/L
ALT SERPL-CCNC: 23 U/L
ANION GAP SERPL CALC-SCNC: 14 MMOL/L
AST SERPL-CCNC: 17 U/L
BILIRUB SERPL-MCNC: 0.3 MG/DL
BUN SERPL-MCNC: 15 MG/DL
CALCIUM SERPL-MCNC: 9.9 MG/DL
CHLORIDE SERPL-SCNC: 101 MMOL/L
CHOLEST SERPL-MCNC: 215 MG/DL
CO2 SERPL-SCNC: 26 MMOL/L
CREAT SERPL-MCNC: 0.67 MG/DL
EGFR: 99 ML/MIN/1.73M2
GLUCOSE SERPL-MCNC: 95 MG/DL
HDLC SERPL-MCNC: 70 MG/DL
LDLC SERPL CALC-MCNC: 115 MG/DL
NONHDLC SERPL-MCNC: 145 MG/DL
POTASSIUM SERPL-SCNC: 4.3 MMOL/L
PROT SERPL-MCNC: 7.2 G/DL
SODIUM SERPL-SCNC: 141 MMOL/L
TRIGL SERPL-MCNC: 148 MG/DL

## 2022-10-18 ENCOUNTER — RX RENEWAL (OUTPATIENT)
Age: 62
End: 2022-10-18

## 2022-12-19 ENCOUNTER — APPOINTMENT (OUTPATIENT)
Dept: GASTROENTEROLOGY | Facility: HOSPITAL | Age: 62
End: 2022-12-19

## 2023-01-17 ENCOUNTER — RX RENEWAL (OUTPATIENT)
Age: 63
End: 2023-01-17

## 2023-02-15 ENCOUNTER — RX RENEWAL (OUTPATIENT)
Age: 63
End: 2023-02-15

## 2023-02-26 ENCOUNTER — NON-APPOINTMENT (OUTPATIENT)
Age: 63
End: 2023-02-26

## 2023-02-28 ENCOUNTER — APPOINTMENT (OUTPATIENT)
Dept: ENDOCRINOLOGY | Facility: CLINIC | Age: 63
End: 2023-02-28
Payer: COMMERCIAL

## 2023-02-28 DIAGNOSIS — E34.9 ENDOCRINE DISORDER, UNSPECIFIED: ICD-10-CM

## 2023-02-28 PROCEDURE — 99214 OFFICE O/P EST MOD 30 MIN: CPT | Mod: 95

## 2023-03-03 NOTE — HISTORY OF PRESENT ILLNESS
[Home] : at home, [unfilled] , at the time of the visit. [Medical Office: ([facility])___] : at the medical office located in  [Verbal consent obtained from patient] : the patient, [unfilled] [FreeTextEntry1] : Feb 28, 2023      iPhone      she and  have Covid\par \par PCP:  Dr. Milly Childers\par          Gyn:  Dr. Presley\par          GI:  at [facility]\par           Derm:  Samuel Behren for basal cell nose  - Dr. Alexandra   and Dr. Silva\par           Nutritionist:  Hannah:  takes calcium, vit D \par \par CC:  Multinodular thyroid - euthyroid:       6/2019    TSH 0.87 (on no Rx)       7/21/20:  Mildly heterogeneous nodular and cystic thyroid\par         (bipolar - Dr. Rogers in )\par          (25 OH vit D 21.7  6/2019 - takes 3000 iu daily)\par \par \par 63 yo to follow up on multinodular thyroid.  \par \par Imp:  Losing bone density -\par \par Plan:  Updated labs.\par Eventual f/u US thyroid\par \par \par \par \par \par \par Nov 11, 2020     VideoChat  iPhone  Facetime\par \par PCP:  Dr. Milly Childers\par          Gyn:  Dr. Presley\par          GI:  at [facility]\par           Derm:  Samuel Behren for basal cell nose  - Dr. Alexandra   and Dr. Silva\par \par CC:  Multinodular thyroid - euthyroid:       6/2019    TSH 0.87 (on no Rx)\par         (bipolar - Dr. Rogers in )\par          (25 OH vit D 21.7  6/2019 - takes 3000 iu daily)\par          Osteoporosis  7/20/2022  T scores:  LS -3.2; FN -2.4; TH -2.0  (falling off curve)          2010 R wrist fx/ice skating \par \par \par 59 yo to follow up on multinodular thyroid.  and new osteoporosis.  \par \par \par Jun 19, 2020    VideoChat  iPhone  Facetime\par \par PCP:  Dr. Milly Childers\par          Gyn:  Dr. Presley\par          GI:  at [facility]\par           Derm:  Samuel Behren for basal cell nose  - Dr. Alexandra   and Dr. Silva\par \par CC:  Multinodular thyroid - euthyroid:       6/2019    TSH 0.87 (on no Rx)\par         (bipolar - Dr. 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        \par \par \par Impression: History of multinodular thyroid, osteopenia.\par \par Plan: Updated US thyroid \par Dr. Presley requested BD.\par \par Call me one week after US.\par ROV 6 months.  \par \par Previous notes from [employer] appended below. \par \par Reason for Appointment \par 1. Euthyroid Multinodular goiter \par 2. Osteopenia \par  \par  \par History of Present Illness \par General:  \par        PMhx:\par        Left retinal hemorrhage with multiple surgeries, Bipolar disorder\par        Was seeing [facility] and found to have multinodular goiter. \par        2/11 US\par        many sub cm thyroid nodules BL. Larges in RUP 8mm and 5mm. 8mm nodule isoechoic with thin halo. Prior US in 2008 similar findings. \par        No CP, SOB, No neck pain, no swelling. Ex smoker. Feb 24,1996. No radiation exposure to neck. No abdominal pain. No Constipation or diarrhea. Hx of fibroids. No hair loss or brittle nails. \par        Family Hx of mother with Graves disease. Mothers cousin with thyroid cancer. Brother does not have thyroid issues\par        Osteopenia Hx: Vitamin D 600iu daily but no longer on. No recent falls or fractures. Oct 2009 Right wrist fracture. No hx of kidney stones. Menapause in early 50's. No hot flashes. \par        2012 Blood work\par        TSH 0.74\par        Vitamin D 27\par        Vitamin B12 860\par        TG 84, Chol 226, , HDL 70\par        CMP. K 4.6, Na 145, Cr 0.6, eGFR >60, LFTs nl. \par        Hgb 13.1, Hct 38. \par  \par Vital Signs \par Ht 62, Wt 166.8, /80, BMI 30.50. \par  \par Examination \par DMS Exam:\par        General Pleasant  F, NAD, Nontoxic. No evidence of abuse or neglect, Not at risk for fall. \par        Head Normocephalic and atraumatic. \par        Eyes PERRL/EOM intact, Conjunctiva and sclera clear without nystagmus. \par        Ears TM's intact and clear with normal canals with grossly normal hearing. \par        Nose No deformity, No discharge, No inflammation, No lesions. \par        Mouth No deformity or lesions with good dentition. \par        Neck No masses, thyromegaly or abnormal cervical codes. \par        Lungs CTA BL. \par        Heart S1S2 RRR no R/M/G. \par        Skin Intact without lesions or rashes. \par  \par  \par Assessments \par 1. Osteopenia - M85.80 (Primary) \par 2. Multinodular goiter - E04.2 \par 3. Vitamin D deficiency - E55.9 \par  \par Treatment \par 1. Osteopenia  \par      LAB: VITAMIN B12 AND FOLATE\par      LAB: CBC\par      LAB: COMPREHENSIVE METABOLIC PANEL\par      LAB: LIPID PANEL\par      LAB: MAGNESIUM\par      LAB: PHOSPHORUS INORGANIC\par      LAB: VITAMIN D 25-HYDROXY\par      LAB: PTH INTACT\par      IMAGING: DEXA\par Notes: Hx of Osteopenia in notes\par Will obtain DEXA scan. Last scan appears to have been in 1612-5708\par Will check CMP, Mag, Phos, PTH, Vitamin D, Vitamin B12 and Folic acid\par Once blood work returns will discuss Vitamin D and calcium replacement.    \par \par 2. Multinodular goiter  \par      LAB: FREE THYROXINE FT4\par      LAB: HEMOGLOBIN A1c\par      LAB: THYROPEROXIDASE AB, SERUM\par      LAB: TSH\par      LAB: THYROGLOBULIN ANTIBODY GROUP\par      IMAGING: US THYROID\par Notes: Multinodular goiter\par Last thyroid US as above\par Will repeat to evaluate nodules\par Will check TSH and Free T4\par Will check TPO and TG abs.    \par  \par  \par

## 2023-03-09 ENCOUNTER — RX RENEWAL (OUTPATIENT)
Age: 63
End: 2023-03-09

## 2023-03-10 ENCOUNTER — RESULT REVIEW (OUTPATIENT)
Age: 63
End: 2023-03-10

## 2023-04-17 ENCOUNTER — RX RENEWAL (OUTPATIENT)
Age: 63
End: 2023-04-17

## 2023-04-19 ENCOUNTER — LABORATORY RESULT (OUTPATIENT)
Age: 63
End: 2023-04-19

## 2023-04-19 ENCOUNTER — APPOINTMENT (OUTPATIENT)
Dept: INTERNAL MEDICINE | Facility: CLINIC | Age: 63
End: 2023-04-19
Payer: COMMERCIAL

## 2023-04-19 ENCOUNTER — NON-APPOINTMENT (OUTPATIENT)
Age: 63
End: 2023-04-19

## 2023-04-19 VITALS
RESPIRATION RATE: 16 BRPM | BODY MASS INDEX: 27.75 KG/M2 | HEIGHT: 61 IN | DIASTOLIC BLOOD PRESSURE: 82 MMHG | HEART RATE: 102 BPM | WEIGHT: 147 LBS | OXYGEN SATURATION: 98 % | SYSTOLIC BLOOD PRESSURE: 128 MMHG | TEMPERATURE: 97.5 F

## 2023-04-19 DIAGNOSIS — F41.9 ANXIETY DISORDER, UNSPECIFIED: ICD-10-CM

## 2023-04-19 DIAGNOSIS — E87.1 HYPO-OSMOLALITY AND HYPONATREMIA: ICD-10-CM

## 2023-04-19 DIAGNOSIS — R79.89 OTHER SPECIFIED ABNORMAL FINDINGS OF BLOOD CHEMISTRY: ICD-10-CM

## 2023-04-19 PROCEDURE — 36415 COLL VENOUS BLD VENIPUNCTURE: CPT

## 2023-04-19 PROCEDURE — 99213 OFFICE O/P EST LOW 20 MIN: CPT | Mod: 25

## 2023-04-19 PROCEDURE — G0444 DEPRESSION SCREEN ANNUAL: CPT | Mod: 59

## 2023-04-19 PROCEDURE — G0439: CPT

## 2023-04-19 PROCEDURE — 93000 ELECTROCARDIOGRAM COMPLETE: CPT | Mod: 59

## 2023-04-19 NOTE — PHYSICAL EXAM
[No Acute Distress] : no acute distress [Well Nourished] : well nourished [Well Developed] : well developed [Well-Appearing] : well-appearing [Normal Sclera/Conjunctiva] : normal sclera/conjunctiva [PERRL] : pupils equal round and reactive to light [EOMI] : extraocular movements intact [Normal Outer Ear/Nose] : the outer ears and nose were normal in appearance [Normal Oropharynx] : the oropharynx was normal [No JVD] : no jugular venous distention [Supple] : supple [No Lymphadenopathy] : no lymphadenopathy [Thyroid Normal, No Nodules] : the thyroid was normal and there were no nodules present [No Respiratory Distress] : no respiratory distress  [No Accessory Muscle Use] : no accessory muscle use [Clear to Auscultation] : lungs were clear to auscultation bilaterally [Normal Rate] : normal rate  [Regular Rhythm] : with a regular rhythm [Normal S1, S2] : normal S1 and S2 [No Murmur] : no murmur heard [No Carotid Bruits] : no carotid bruits [No Abdominal Bruit] : a ~M bruit was not heard ~T in the abdomen [No Varicosities] : no varicosities [Pedal Pulses Present] : the pedal pulses are present [No Edema] : there was no peripheral edema [No Palpable Aorta] : no palpable aorta [No Extremity Clubbing/Cyanosis] : no extremity clubbing/cyanosis [Soft] : abdomen soft [Non Tender] : non-tender [Non-distended] : non-distended [No Masses] : no abdominal mass palpated [No HSM] : no HSM [Normal Bowel Sounds] : normal bowel sounds [Normal Posterior Cervical Nodes] : no posterior cervical lymphadenopathy [Normal Anterior Cervical Nodes] : no anterior cervical lymphadenopathy [No CVA Tenderness] : no CVA  tenderness [No Spinal Tenderness] : no spinal tenderness [No Joint Swelling] : no joint swelling [Grossly Normal Strength/Tone] : grossly normal strength/tone [No Rash] : no rash [No Focal Deficits] : no focal deficits [Coordination Grossly Intact] : coordination grossly intact [Normal Gait] : normal gait [Deep Tendon Reflexes (DTR)] : deep tendon reflexes were 2+ and symmetric [Normal Affect] : the affect was normal [Normal Insight/Judgement] : insight and judgment were intact

## 2023-04-19 NOTE — HISTORY OF PRESENT ILLNESS
[Other: _____] : [unfilled] [FreeTextEntry1] : wellness exam  [de-identified] : 1 .patient is still seeing psychiatry follows Dr. Akhtar \par 2. patient was diagnosed with osteoporosis , and follows dr. hellerman , and is on calciu \par 3. patient is upto date with mammogram \par 4. she follows dr. lafleur, had a negative cologuard and has not scheduled a colonoscopy as of yet

## 2023-04-19 NOTE — COUNSELING
[Behavioral health counseling provided] : Behavioral health counseling provided [Sleep ___ hours/day] : Sleep [unfilled] hours/day [Potential consequences of obesity discussed] : Potential consequences of obesity discussed [Benefits of weight loss discussed] : Benefits of weight loss discussed [Structured Weight Management Program suggested:] : Structured weight management program suggested [Encouraged to maintain food diary] : Encouraged to maintain food diary [Encouraged to increase physical activity] : Encouraged to increase physical activity

## 2023-04-19 NOTE — HEALTH RISK ASSESSMENT
[Fair] :  ~his/her~ mood as fair [Never (0 pts)] : Never (0 points) [No] : In the past 12 months have you used drugs other than those required for medical reasons? No [Any fall with injury in past year] : Patient reported fall with injury in the past year [1] : 2) Feeling down, depressed, or hopeless for several days (1) [PHQ-2 Negative - No further assessment needed] : PHQ-2 Negative - No further assessment needed [Patient reported mammogram was normal] : Patient reported mammogram was normal [Patient reported bone density results were abnormal] : Patient reported bone density results were abnormal [HIV test declined] : HIV test declined [Hepatitis C test declined] : Hepatitis C test declined [None] : None [With Significant Other] : lives with significant other [Retired] : retired [] :  [# Of Children ___] : has [unfilled] children [Feels Safe at Home] : Feels safe at home [Fully functional (bathing, dressing, toileting, transferring, walking, feeding)] : Fully functional (bathing, dressing, toileting, transferring, walking, feeding) [Fully functional (using the telephone, shopping, preparing meals, housekeeping, doing laundry, using] : Fully functional and needs no help or supervision to perform IADLs (using the telephone, shopping, preparing meals, housekeeping, doing laundry, using transportation, managing medications and managing finances) [Smoke Detector] : smoke detector [Carbon Monoxide Detector] : carbon monoxide detector [Seat Belt] :  uses seat belt [Sunscreen] : uses sunscreen [Never] : Never [Audit-CScore] : 0 [UST4Fpija] : 2 [Change in mental status noted] : No change in mental status noted [Language] : denies difficulty with language [Behavior] : denies difficulty with behavior [Learning/Retaining New Information] : denies difficulty learning/retaining new information [Handling Complex Tasks] : denies difficulty handling complex tasks [Reasoning] : denies difficulty with reasoning [Spatial Ability and Orientation] : denies difficulty with spatial ability and orientation [Sexually Active] : not sexually active [High Risk Behavior] : no high risk behavior [Reports changes in hearing] : Reports no changes in hearing [Reports changes in vision] : Reports no changes in vision [Reports changes in dental health] : Reports no changes in dental health [Guns at Home] : no guns at home [Safety elements used in home] : no safety elements used in home [Travel to Developing Areas] : does not  travel to developing areas [TB Exposure] : is not being exposed to tuberculosis [Caregiver Concerns] : does not have caregiver concerns [MammogramDate] : 01/22 [PapSmearDate] : 01/22 [BoneDensityDate] : 01/23 [BoneDensityComments] : Osteoporosis [ColonoscopyDate] : 01/10

## 2023-04-25 LAB
25(OH)D3 SERPL-MCNC: 40.5 NG/ML
ALBUMIN SERPL ELPH-MCNC: 5 G/DL
ALP BLD-CCNC: 87 U/L
ALT SERPL-CCNC: 28 U/L
ANION GAP SERPL CALC-SCNC: 14 MMOL/L
APPEARANCE: CLEAR
AST SERPL-CCNC: 17 U/L
BASOPHILS # BLD AUTO: 0.05 K/UL
BASOPHILS NFR BLD AUTO: 0.9 %
BILIRUB SERPL-MCNC: 0.5 MG/DL
BILIRUBIN URINE: NEGATIVE
BLOOD URINE: ABNORMAL
BUN SERPL-MCNC: 16 MG/DL
CALCIUM SERPL-MCNC: 10.6 MG/DL
CHLORIDE SERPL-SCNC: 101 MMOL/L
CHOLEST SERPL-MCNC: 206 MG/DL
CO2 SERPL-SCNC: 28 MMOL/L
COLOR: NORMAL
CREAT SERPL-MCNC: 0.73 MG/DL
EGFR: 92 ML/MIN/1.73M2
EOSINOPHIL # BLD AUTO: 0.08 K/UL
EOSINOPHIL NFR BLD AUTO: 1.4 %
ESTIMATED AVERAGE GLUCOSE: 114 MG/DL
GLUCOSE QUALITATIVE U: NEGATIVE
GLUCOSE SERPL-MCNC: 112 MG/DL
HBA1C MFR BLD HPLC: 5.6 %
HCT VFR BLD CALC: 46.5 %
HDLC SERPL-MCNC: 84 MG/DL
HGB BLD-MCNC: 14.8 G/DL
IMM GRANULOCYTES NFR BLD AUTO: 0.2 %
KETONES URINE: NORMAL
LDLC SERPL CALC-MCNC: 110 MG/DL
LEUKOCYTE ESTERASE URINE: ABNORMAL
LYMPHOCYTES # BLD AUTO: 1.42 K/UL
LYMPHOCYTES NFR BLD AUTO: 25.1 %
MAN DIFF?: NORMAL
MCHC RBC-ENTMCNC: 30.4 PG
MCHC RBC-ENTMCNC: 31.8 GM/DL
MCV RBC AUTO: 95.5 FL
MONOCYTES # BLD AUTO: 0.37 K/UL
MONOCYTES NFR BLD AUTO: 6.5 %
NEUTROPHILS # BLD AUTO: 3.73 K/UL
NEUTROPHILS NFR BLD AUTO: 65.9 %
NITRITE URINE: NEGATIVE
NONHDLC SERPL-MCNC: 123 MG/DL
PH URINE: 6
PLATELET # BLD AUTO: 260 K/UL
POTASSIUM SERPL-SCNC: 4.4 MMOL/L
PROT SERPL-MCNC: 8 G/DL
PROTEIN URINE: NORMAL
RBC # BLD: 4.87 M/UL
RBC # FLD: 12.4 %
SODIUM SERPL-SCNC: 142 MMOL/L
SPECIFIC GRAVITY URINE: 1.02
T4 SERPL-MCNC: 10.1 UG/DL
TRIGL SERPL-MCNC: 66 MG/DL
TSH SERPL-ACNC: 0.79 UIU/ML
UROBILINOGEN URINE: NORMAL
VIT B12 SERPL-MCNC: 1249 PG/ML
WBC # FLD AUTO: 5.66 K/UL

## 2023-04-29 DIAGNOSIS — D86.9 SARCOIDOSIS, UNSPECIFIED: ICD-10-CM

## 2023-04-30 ENCOUNTER — RESULT REVIEW (OUTPATIENT)
Age: 63
End: 2023-04-30

## 2023-05-04 ENCOUNTER — RESULT REVIEW (OUTPATIENT)
Age: 63
End: 2023-05-04

## 2023-05-04 ENCOUNTER — APPOINTMENT (OUTPATIENT)
Dept: GASTROENTEROLOGY | Facility: HOSPITAL | Age: 63
End: 2023-05-04

## 2023-05-08 ENCOUNTER — APPOINTMENT (OUTPATIENT)
Dept: GASTROENTEROLOGY | Facility: CLINIC | Age: 63
End: 2023-05-08

## 2023-05-16 ENCOUNTER — NON-APPOINTMENT (OUTPATIENT)
Age: 63
End: 2023-05-16

## 2023-05-30 ENCOUNTER — RX RENEWAL (OUTPATIENT)
Age: 63
End: 2023-05-30

## 2023-06-05 ENCOUNTER — APPOINTMENT (OUTPATIENT)
Dept: GASTROENTEROLOGY | Facility: CLINIC | Age: 63
End: 2023-06-05
Payer: COMMERCIAL

## 2023-06-05 DIAGNOSIS — Z12.11 ENCOUNTER FOR SCREENING FOR MALIGNANT NEOPLASM OF COLON: ICD-10-CM

## 2023-06-05 PROCEDURE — 99442: CPT

## 2023-06-05 NOTE — ASSESSMENT
[FreeTextEntry1] : Screening colonoscopy\par Risks (including but not limited to sedation risks, infection, bleeding, perforation, possibility of missed lesions), benefits and alternatives to procedure, including not doing the procedure, were discussed with patient. Patient understood and agreed to proceed with colonoscopy. \par Colonoscopy preparation instructions reviewed with patient.\par LF diet x 3 days\par 2 Dulcolax two days prior to procedure + Split MiraLAX/Dulcolax preparation \par patient will hold iron x 5 days and take her other medications the night before the procedure. \par all questions answered

## 2023-06-05 NOTE — PHYSICAL EXAM
[General Appearance - Alert] : alert [General Appearance - In No Acute Distress] : in no acute distress [Hearing Threshold Finger Rub Not Wetzel] : hearing was normal [Oriented To Time, Place, And Person] : oriented to person, place, and time [] : normal voice and communication

## 2023-06-05 NOTE — HISTORY OF PRESENT ILLNESS
[Home] : at home, [unfilled] , at the time of the visit. [Medical Office: ([facility])___] : at the medical office located in  [Verbal consent obtained from patient] : the patient, [unfilled] [FreeTextEntry1] : 62 year old F htn, hld, osteopenia, BPD, presents for evaluation of colon cancer screening.\par She is seen at the request of Dr. Milly Childers\par \par 1st colonoscopy, 12 years ago- normal. told to repeat in 10 years. negative cologuard in 2/23\par she has now decided to move forward with colonoscopy. \par \par bm every other day- every 2 days\par takes fiber pills \par \par Patient denies abdominal pain , n/v/heartburn, reflux, dysphagia/odynophagia, change in bowel habits, diarrhea, constipation, brbpr, melena. no weight loss.  Good appetite. denies regular NSAID use. \par \par weaning of imipramine\par \par fam hx- \par negative for colon polyps, colon cancer\par father- esophageal and stomach, etOH and smoking\par \par mother - breast ca

## 2023-06-22 ENCOUNTER — APPOINTMENT (OUTPATIENT)
Dept: INTERNAL MEDICINE | Facility: CLINIC | Age: 63
End: 2023-06-22

## 2023-06-23 ENCOUNTER — RX RENEWAL (OUTPATIENT)
Age: 63
End: 2023-06-23

## 2023-07-11 ENCOUNTER — APPOINTMENT (OUTPATIENT)
Dept: INTERNAL MEDICINE | Facility: CLINIC | Age: 63
End: 2023-07-11
Payer: COMMERCIAL

## 2023-07-11 ENCOUNTER — LABORATORY RESULT (OUTPATIENT)
Age: 63
End: 2023-07-11

## 2023-07-11 DIAGNOSIS — N39.0 URINARY TRACT INFECTION, SITE NOT SPECIFIED: ICD-10-CM

## 2023-07-11 PROCEDURE — P9615: CPT

## 2023-07-12 PROBLEM — N39.0 ACUTE UTI: Status: RESOLVED | Noted: 2023-07-11 | Resolved: 2023-08-10

## 2023-07-14 LAB
APPEARANCE: CLEAR
BILIRUBIN URINE: NEGATIVE
BLOOD URINE: NEGATIVE
COLOR: YELLOW
GLUCOSE QUALITATIVE U: NEGATIVE MG/DL
KETONES URINE: NEGATIVE MG/DL
LEUKOCYTE ESTERASE URINE: ABNORMAL
NITRITE URINE: NEGATIVE
PH URINE: 7
PROTEIN URINE: NEGATIVE MG/DL
SPECIFIC GRAVITY URINE: 1.01
UROBILINOGEN URINE: 0.2 MG/DL

## 2023-08-14 ENCOUNTER — RESULT REVIEW (OUTPATIENT)
Age: 63
End: 2023-08-14

## 2023-08-31 ENCOUNTER — APPOINTMENT (OUTPATIENT)
Dept: GASTROENTEROLOGY | Facility: HOSPITAL | Age: 63
End: 2023-08-31

## 2023-09-06 ENCOUNTER — APPOINTMENT (OUTPATIENT)
Dept: ENDOCRINOLOGY | Facility: CLINIC | Age: 63
End: 2023-09-06

## 2023-09-13 ENCOUNTER — APPOINTMENT (OUTPATIENT)
Dept: INTERNAL MEDICINE | Facility: CLINIC | Age: 63
End: 2023-09-13
Payer: COMMERCIAL

## 2023-09-13 DIAGNOSIS — R30.0 DYSURIA: ICD-10-CM

## 2023-09-13 PROCEDURE — P9615: CPT

## 2023-09-14 LAB
APPEARANCE: CLEAR
BILIRUBIN URINE: NEGATIVE
BLOOD URINE: NEGATIVE
COLOR: YELLOW
GLUCOSE QUALITATIVE U: NEGATIVE MG/DL
KETONES URINE: NEGATIVE MG/DL
LEUKOCYTE ESTERASE URINE: NEGATIVE
NITRITE URINE: NEGATIVE
PH URINE: 8
PROTEIN URINE: NEGATIVE MG/DL
SPECIFIC GRAVITY URINE: 1.01
UROBILINOGEN URINE: 0.2 MG/DL

## 2023-10-01 RX ORDER — ERGOCALCIFEROL 1.25 MG/1
1.25 MG CAPSULE ORAL
Qty: 13 | Refills: 1 | Status: ACTIVE | COMMUNITY
Start: 2023-03-01 | End: 1900-01-01

## 2023-10-05 ENCOUNTER — RX RENEWAL (OUTPATIENT)
Age: 63
End: 2023-10-05

## 2023-10-10 ENCOUNTER — RX RENEWAL (OUTPATIENT)
Age: 63
End: 2023-10-10

## 2023-10-26 ENCOUNTER — RX RENEWAL (OUTPATIENT)
Age: 63
End: 2023-10-26

## 2023-12-04 ENCOUNTER — APPOINTMENT (OUTPATIENT)
Dept: ENDOCRINOLOGY | Facility: CLINIC | Age: 63
End: 2023-12-04
Payer: COMMERCIAL

## 2023-12-04 VITALS
DIASTOLIC BLOOD PRESSURE: 70 MMHG | OXYGEN SATURATION: 96 % | HEIGHT: 61 IN | WEIGHT: 160 LBS | SYSTOLIC BLOOD PRESSURE: 122 MMHG | HEART RATE: 76 BPM | BODY MASS INDEX: 30.21 KG/M2

## 2023-12-04 DIAGNOSIS — M81.8 OTHER OSTEOPOROSIS W/OUT CURRENT PATHOLOGICAL FRACTURE: ICD-10-CM

## 2023-12-04 DIAGNOSIS — E04.2 NONTOXIC MULTINODULAR GOITER: ICD-10-CM

## 2023-12-04 PROCEDURE — 99215 OFFICE O/P EST HI 40 MIN: CPT

## 2023-12-04 RX ORDER — CEFUROXIME AXETIL 500 MG/1
500 TABLET ORAL
Qty: 14 | Refills: 0 | Status: DISCONTINUED | COMMUNITY
Start: 2023-04-24 | End: 2023-12-04

## 2023-12-04 RX ORDER — LAMOTRIGINE 100 MG/1
100 TABLET ORAL TWICE DAILY
Qty: 180 | Refills: 0 | Status: DISCONTINUED | COMMUNITY
Start: 2022-06-16 | End: 2023-12-04

## 2023-12-04 RX ORDER — ERGOCALCIFEROL 1.25 MG/1
1.25 MG CAPSULE ORAL
Refills: 0 | Status: DISCONTINUED | COMMUNITY
End: 2023-12-04

## 2023-12-05 PROBLEM — M81.8 OTHER OSTEOPOROSIS: Status: ACTIVE | Noted: 2023-02-28

## 2023-12-05 PROBLEM — E04.2 MULTINODULAR GOITER: Status: ACTIVE | Noted: 2019-03-26

## 2023-12-13 ENCOUNTER — RESULT REVIEW (OUTPATIENT)
Age: 63
End: 2023-12-13

## 2023-12-17 ENCOUNTER — RESULT REVIEW (OUTPATIENT)
Age: 63
End: 2023-12-17

## 2023-12-18 ENCOUNTER — APPOINTMENT (OUTPATIENT)
Dept: INTERNAL MEDICINE | Facility: CLINIC | Age: 63
End: 2023-12-18

## 2024-01-02 ENCOUNTER — RX RENEWAL (OUTPATIENT)
Age: 64
End: 2024-01-02

## 2024-01-09 ENCOUNTER — RX RENEWAL (OUTPATIENT)
Age: 64
End: 2024-01-09

## 2024-01-11 ENCOUNTER — RX RENEWAL (OUTPATIENT)
Age: 64
End: 2024-01-11

## 2024-01-12 ENCOUNTER — RX RENEWAL (OUTPATIENT)
Age: 64
End: 2024-01-12

## 2024-03-11 DIAGNOSIS — N20.0 CALCULUS OF KIDNEY: ICD-10-CM

## 2024-03-13 ENCOUNTER — RESULT REVIEW (OUTPATIENT)
Age: 64
End: 2024-03-13

## 2024-03-17 RX ORDER — CEPHALEXIN 500 MG/1
500 TABLET ORAL 3 TIMES DAILY
Qty: 15 | Refills: 0 | Status: DISCONTINUED | COMMUNITY
Start: 2023-07-14 | End: 2024-03-17

## 2024-03-17 RX ORDER — ARIPIPRAZOLE 10 MG/1
10 TABLET ORAL
Refills: 0 | Status: ACTIVE | COMMUNITY
Start: 2024-03-17

## 2024-03-17 RX ORDER — SERTRALINE HYDROCHLORIDE 50 MG/1
50 TABLET, FILM COATED ORAL DAILY
Qty: 90 | Refills: 0 | Status: DISCONTINUED | COMMUNITY
Start: 2022-06-16 | End: 2024-03-17

## 2024-03-22 ENCOUNTER — RX RENEWAL (OUTPATIENT)
Age: 64
End: 2024-03-22

## 2024-03-22 RX ORDER — LOSARTAN POTASSIUM 100 MG/1
100 TABLET, FILM COATED ORAL
Qty: 90 | Refills: 0 | Status: ACTIVE | COMMUNITY
Start: 2020-08-17 | End: 1900-01-01

## 2024-03-25 LAB
24R-OH-CALCIDIOL SERPL-MCNC: 57.5 PG/ML
ANION GAP SERPL CALC-SCNC: 11 MMOL/L
BUN SERPL-MCNC: 28 MG/DL
CA-I SERPL-SCNC: 5.2 MG/DL
CALCIUM SERPL-MCNC: 9.7 MG/DL
CALCIUM SERPL-MCNC: 9.7 MG/DL
CHLORIDE SERPL-SCNC: 99 MMOL/L
CO2 SERPL-SCNC: 27 MMOL/L
COLLAGEN CTX SERPL-MCNC: 185 PG/ML
CREAT SERPL-MCNC: 0.71 MG/DL
EGFR: 95 ML/MIN/1.73M2
GLUCOSE SERPL-MCNC: 93 MG/DL
OSTEOCALCIN SERPL-MCNC: 17 NG/ML
PARATHYROID HORMONE INTACT: 38 PG/ML
PHOSPHATE SERPL-MCNC: 3.2 MG/DL
POTASSIUM SERPL-SCNC: 4.7 MMOL/L
SODIUM SERPL-SCNC: 138 MMOL/L
TSH SERPL-ACNC: 0.62 UIU/ML

## 2024-03-25 RX ORDER — ERGOCALCIFEROL 1.25 MG/1
1.25 MG CAPSULE, LIQUID FILLED ORAL
Qty: 13 | Refills: 1 | Status: ACTIVE | COMMUNITY
Start: 2024-01-12 | End: 1900-01-01

## 2024-04-03 ENCOUNTER — RX RENEWAL (OUTPATIENT)
Age: 64
End: 2024-04-03

## 2024-04-03 RX ORDER — AMLODIPINE BESYLATE 10 MG/1
10 TABLET ORAL
Qty: 90 | Refills: 0 | Status: ACTIVE | COMMUNITY
Start: 2021-01-05 | End: 1900-01-01

## 2024-04-08 ENCOUNTER — RX RENEWAL (OUTPATIENT)
Age: 64
End: 2024-04-08

## 2024-04-08 RX ORDER — ATORVASTATIN CALCIUM 10 MG/1
10 TABLET, FILM COATED ORAL
Qty: 90 | Refills: 0 | Status: ACTIVE | COMMUNITY
Start: 2022-03-25 | End: 1900-01-01

## 2024-05-01 ENCOUNTER — APPOINTMENT (OUTPATIENT)
Dept: INTERNAL MEDICINE | Facility: CLINIC | Age: 64
End: 2024-05-01

## 2024-06-06 ENCOUNTER — APPOINTMENT (OUTPATIENT)
Dept: FAMILY MEDICINE | Facility: CLINIC | Age: 64
End: 2024-06-06
Payer: COMMERCIAL

## 2024-06-06 ENCOUNTER — NON-APPOINTMENT (OUTPATIENT)
Age: 64
End: 2024-06-06

## 2024-06-06 VITALS
HEART RATE: 74 BPM | DIASTOLIC BLOOD PRESSURE: 91 MMHG | SYSTOLIC BLOOD PRESSURE: 145 MMHG | BODY MASS INDEX: 32.71 KG/M2 | OXYGEN SATURATION: 95 % | RESPIRATION RATE: 16 BRPM | WEIGHT: 173.25 LBS | HEIGHT: 61 IN

## 2024-06-06 DIAGNOSIS — F31.9 BIPOLAR DISORDER, UNSPECIFIED: ICD-10-CM

## 2024-06-06 DIAGNOSIS — I10 ESSENTIAL (PRIMARY) HYPERTENSION: ICD-10-CM

## 2024-06-06 DIAGNOSIS — E55.9 VITAMIN D DEFICIENCY, UNSPECIFIED: ICD-10-CM

## 2024-06-06 DIAGNOSIS — E21.3 HYPERPARATHYROIDISM, UNSPECIFIED: ICD-10-CM

## 2024-06-06 DIAGNOSIS — E78.5 HYPERLIPIDEMIA, UNSPECIFIED: ICD-10-CM

## 2024-06-06 DIAGNOSIS — Z00.00 ENCOUNTER FOR GENERAL ADULT MEDICAL EXAMINATION W/OUT ABNORMAL FINDINGS: ICD-10-CM

## 2024-06-06 PROCEDURE — 99386 PREV VISIT NEW AGE 40-64: CPT

## 2024-06-06 PROCEDURE — 99204 OFFICE O/P NEW MOD 45 MIN: CPT

## 2024-06-06 RX ORDER — CALCIUM CARBONATE/VITAMIN D3 500 MG-2.5
TABLET,CHEWABLE ORAL
Refills: 0 | Status: ACTIVE | COMMUNITY

## 2024-06-06 RX ORDER — MULTIVIT WITH CALCIUM,IRON,MIN
TABLET ORAL DAILY
Refills: 0 | Status: ACTIVE | COMMUNITY

## 2024-06-06 NOTE — HISTORY OF PRESENT ILLNESS
[FreeTextEntry1] : Annual wellness visit and establish care [de-identified] : 63-year-old female presenting for annual wellness visit and to establish care Has a daughter, Magdalena and 2 grandchildren aged 2 years old Salvalvin and 4-month-old Gavi Tom done in 2023, negative History of bipolar but states that has been well-controlled for over 40 years although medications make her hungry Immunizations up-to-date.  States she got a tetanus shot in 2021 but does not have the documented proof Mammogram and Pap smear performed by by outside gynecologist

## 2024-06-06 NOTE — ASSESSMENT
[FreeTextEntry1] : 63-year-old female No complaints up-to-date Asked for records for mammogram. Follow-up labs Continue mental health management with psychiatry team

## 2024-06-06 NOTE — HEALTH RISK ASSESSMENT
[Good] : ~his/her~  mood as  good [1 or 2 (0 pts)] : 1 or 2 (0 points) [Never (0 pts)] : Never (0 points) [No] : In the past 12 months have you used drugs other than those required for medical reasons? No [One fall no injury in past year] : Patient reported one fall in the past year without injury [0] : 2) Feeling down, depressed, or hopeless: Not at all (0) [PHQ-2 Negative - No further assessment needed] : PHQ-2 Negative - No further assessment needed [Patient reported mammogram was normal] : Patient reported mammogram was normal [Patient reported colonoscopy was normal] : Patient reported colonoscopy was normal [With Family] : lives with family [Retired] : retired [] :  [Feels Safe at Home] : Feels safe at home [Reports changes in vision] : Reports changes in vision [Smoke Detector] : smoke detector [Carbon Monoxide Detector] : carbon monoxide detector [Seat Belt] :  uses seat belt [Never] : Never [Audit-CScore] : 0 [Abago] : 2s [[id number]] : 0 [Change in mental status noted] : No change in mental status noted [Reports changes in hearing] : Reports no changes in hearing [Reports changes in dental health] : Reports no changes in dental health [MammogramDate] : 01/2023 [ColonoscopyDate] : 01/2014

## 2024-06-19 LAB
25(OH)D3 SERPL-MCNC: 40 NG/ML
ALBUMIN SERPL ELPH-MCNC: 4.5 G/DL
ALP BLD-CCNC: 75 U/L
ALT SERPL-CCNC: 25 U/L
ANION GAP SERPL CALC-SCNC: 12 MMOL/L
AST SERPL-CCNC: 21 U/L
BILIRUB SERPL-MCNC: 0.3 MG/DL
BUN SERPL-MCNC: 19 MG/DL
CALCIUM SERPL-MCNC: 9.6 MG/DL
CHLORIDE SERPL-SCNC: 97 MMOL/L
CHOLEST SERPL-MCNC: 194 MG/DL
CO2 SERPL-SCNC: 28 MMOL/L
CREAT SERPL-MCNC: 0.64 MG/DL
EGFR: 99 ML/MIN/1.73M2
ESTIMATED AVERAGE GLUCOSE: 111 MG/DL
GLUCOSE SERPL-MCNC: 103 MG/DL
HBA1C MFR BLD HPLC: 5.5 %
HCT VFR BLD CALC: 43.4 %
HDLC SERPL-MCNC: 70 MG/DL
HGB BLD-MCNC: 13.5 G/DL
LDLC SERPL CALC-MCNC: 108 MG/DL
MCHC RBC-ENTMCNC: 29 PG
MCHC RBC-ENTMCNC: 31.1 GM/DL
MCV RBC AUTO: 93.3 FL
NONHDLC SERPL-MCNC: 124 MG/DL
PLATELET # BLD AUTO: 241 K/UL
POTASSIUM SERPL-SCNC: 4.3 MMOL/L
PROT SERPL-MCNC: 7.1 G/DL
RBC # BLD: 4.65 M/UL
RBC # FLD: 13.2 %
SODIUM SERPL-SCNC: 136 MMOL/L
TRIGL SERPL-MCNC: 90 MG/DL
TSH SERPL-ACNC: 0.6 UIU/ML
WBC # FLD AUTO: 7.04 K/UL

## 2024-06-25 ENCOUNTER — APPOINTMENT (OUTPATIENT)
Dept: UROLOGY | Facility: CLINIC | Age: 64
End: 2024-06-25

## 2024-07-03 ENCOUNTER — RESULT REVIEW (OUTPATIENT)
Age: 64
End: 2024-07-03

## 2024-08-01 ENCOUNTER — APPOINTMENT (OUTPATIENT)
Dept: VASCULAR SURGERY | Facility: CLINIC | Age: 64
End: 2024-08-01
Payer: COMMERCIAL

## 2024-08-01 ENCOUNTER — NON-APPOINTMENT (OUTPATIENT)
Age: 64
End: 2024-08-01

## 2024-08-01 VITALS — WEIGHT: 176.38 LBS | BODY MASS INDEX: 33.3 KG/M2 | HEIGHT: 61 IN

## 2024-08-01 PROCEDURE — 99203 OFFICE O/P NEW LOW 30 MIN: CPT

## 2024-08-01 NOTE — PHYSICAL EXAM
[Respiratory Effort] : normal respiratory effort [Alert] : alert [Oriented to Person] : oriented to person [Oriented to Place] : oriented to place [Oriented to Time] : oriented to time [Calm] : calm [Normal Breath Sounds] : Normal breath sounds [Ankle Swelling (On Exam)] : present [Ankle Swelling Bilaterally] : severe [Varicose Veins Of Lower Extremities] : not present [] : bilaterally [Ankle Swelling On The Right] : mild [de-identified] : WN/WD.  Obese.  Anxious [de-identified] : Clear [de-identified] : Clear [FreeTextEntry1] : Small venous telangiectasias in the thighs and a few in the ankle area.  Nothing significant.  No bulging venous varices.  Redness is present along the medial aspect of the left calf which resolved completely with slight elevation of the leg. [de-identified] : FROM

## 2024-08-01 NOTE — HISTORY OF PRESENT ILLNESS
[FreeTextEntry1] : 62 y/o F referred by [facility]. She has a PMHx of bipolar disorder and osteoporosis who presents complaining of recurrent episodes of cellulitis in the left calf.  Both legs are swollen.  She has gained about 20 pounds over the past several months.  She was referred by her podiatrist who told her of weakened veins.  No previous history of phlebitis or deep venous thrombosis.  Her weight is 176.7 pounds and she is 5 foot 3 and BMI is approximately 31.  Past medical history is noted for hypertension, hyperlipidemia.  SHx: -  FHx: -

## 2024-08-01 NOTE — ADDENDUM
[FreeTextEntry1] : I, Dr. Mervin Brush, personally performed the evaluation and management (E/M) services for this new patient.  That E/M includes conducting the initial examination, assessing all conditions, and establishing the plan of care.  Today, my ACP, Yanelis Shaffer NP, was here to observe my evaluation and management services for this patient to be followed going forward.

## 2024-08-01 NOTE — ASSESSMENT
[Arterial/Venous Disease] : arterial/venous disease [FreeTextEntry1] : 62 y/o F w/ obesity and recurrent "cellulitis". On exam today there is no evidence of cellulitis.  Rather edema is very significant and stasis dermatitis is present Findings discussed with pt and recommend that she must reduce her weight.  I recommended weight loss drugs which she seems to be opposed into.  I told her that she may have to pursue a surgical option.  In the meantime I recommended moisturizing her skin since that she does have calluses on some of her toes, from the feet up to the knees.  I also told her that she has to use a higher compression surgical support stocking.  Presently she uses a pantyhose Sigvaris  20 to 30 mmHg stocking that she was prescribed on [place].  I recommend that she advance to a 30 to 40 mm compression.    Her pharmacy is [facility] in [address]. in for [facility].  Phone #[phone]

## 2024-08-06 ENCOUNTER — RX RENEWAL (OUTPATIENT)
Age: 64
End: 2024-08-06

## 2024-08-06 ENCOUNTER — APPOINTMENT (OUTPATIENT)
Dept: ENDOCRINOLOGY | Facility: CLINIC | Age: 64
End: 2024-08-06

## 2024-08-06 PROCEDURE — 99215 OFFICE O/P EST HI 40 MIN: CPT

## 2024-08-06 RX ORDER — HYDROCHLOROTHIAZIDE 12.5 MG/1
12.5 TABLET ORAL DAILY
Qty: 30 | Refills: 0 | Status: ACTIVE | COMMUNITY
Start: 2024-08-02 | End: 1900-01-01

## 2024-08-06 NOTE — HISTORY OF PRESENT ILLNESS
[FreeTextEntry1] : Aug 06, 2024  in person  PCP:  Dr. Milly Childers          Gyn:  Dr. Presley          GI:  at [facility]           Derm:  Samuel Behren for basal cell nose  - Dr. Alexandra   and Dr. Silva           Nutritionist:  Hannah:  takes calcium, vit D   CC:  Multinodular thyroid - euthyroid:       6/2019    TSH 0.87 (on no Rx)       7/21/20:  Mildly heterogeneous nodular and cystic thyroid        Osteoporosis 7/20/2022  T scores:  LS -3.2  FN -2.4  TH -2.0             24 hour urine calcium 59                (bipolar - Dr. Rogers in , Dr. Martin  )          (25 OH vit D 21.7  6/2019 - takes 3000 iu daily)   64 yo visits  to follow up on multinodular thyroid and osteoporosis  Lab tests from 6/6/2024 include TSH 0.6 vit D 40 Recently on treatment for cellulitis.  : : Constitutional:  Alert, well nourished, healthy appearance, no acute distress  Eyes:  No proptosis, no stare Thyroid:  normal to palpation  Pulmonary:  No respiratory distress, no accessory muscle use; normal rate and effort Cardiac:  Normal rate Vascular:  Endocrine:  No stigmata of Cushings Syndrome Musculoskeletal:  Normal gait, no involuntary movements Neurology:  No tremors Affect/Mood/Psych:  Oriented x 3; normal affect, normal insight/judgement, normal mood  . Impression:  US thyroid 12/2023:  Bilateral benign-appearing thyroid nodules. There are no suspicious lesions.  Recent bone density shows osteoporosis of spine with T score -2.9 (no compressions on X-ray)  Plan:  Advised alendronate - Like her friend takes.   Warned of potential side effects.     Dec 04, 2023    in person  PCP:  Dr. Milly Childers          Gyn:  Dr. Presley          GI:  at [facility]           Derm:  Samuel Behren for basal cell nose  - Dr. Alexandra   and Dr. Silva           Nutritionist:  Hannah:  takes calcium, vit D   CC:  Multinodular thyroid - euthyroid:       6/2019    TSH 0.87 (on no Rx)       7/21/20:  Mildly heterogeneous nodular and cystic thyroid        Osteoporosis 7/20/2022  T scores:  LS -3.2  FN -2.4  TH -2.0         (bipolar - Dr. Rogers in [place], Dr. aMrtin  )          (25 OH vit D 21.7  6/2019 - takes 3000 iu daily)   64 yo visits  to follow up on multinodular thyroid and osteoporosis   She is excited to be enrolled in the [employer] [facility] via her treatment for a skin cancer on her nose.   The program has helped her anxiety and depression.  : : Constitutional:  Alert, well nourished, healthy appearance, no acute distress  Eyes:  No proptosis, no stare Thyroid: slightly nodular, non-tender Pulmonary:  No respiratory distress, no accessory muscle use; normal rate and effort Cardiac:  Normal rate Vascular:  Endocrine:  No stigmata of Cushings Syndrome Musculoskeletal:  Normal gait, no involuntary movements Neurology:  No tremors Affect/Mood/Psych:  Oriented x 3; normal affect, normal insight/judgement, normal mood  .  Imp/Plan: MNG:  She will go for updated US at [facility] in the near future. Osteoporosis:  Falling off the curve.   Will benefit from pharmacologic intervention as well as calcium intake of 1000 mg/dl, vit D levels in 30-50 range, fall avoidance and weight bearing exercise. She will go for updated bone density at [facility] in late July and ROV here in August, likely to start on medication     Feb 28, 2023      iPhone      she and  have Covid  PCP:  Dr. Milly Childers          Gyn:  Dr. Presley          GI:  at [facility]           Derm:  Samuel Behren for basal cell nose  - Dr. Alexandra   and Dr. Silva           Nutritionist:  Hannah:  takes calcium, vit D   CC:  Multinodular thyroid - euthyroid:       6/2019    TSH 0.87 (on no Rx)       7/21/20:  Mildly heterogeneous nodular and cystic thyroid         (bipolar - Dr. Rogers in )          (25 OH vit D 21.7  6/2019 - takes 3000 iu daily)   61 yo to follow up on multinodular thyroid.    Imp:  Losing bone density -  Plan:  Updated labs. Eventual f/u US thyroid       Nov 11, 2020     VideoChat  iPhone  Facetime  PCP:  Dr. Milly Childers          Gyn:  Dr. Presley          GI:  at [facility]           Derm:  Samuel Behren for basal cell nose  - Dr. Annie Silva  CC:  Multinodular thyroid - euthyroid:       6/2019    TSH 0.87 (on no Rx)         (bipolar - Dr. Rogers in )          (25 OH vit D 21.7  6/2019 - takes 3000 iu daily)          Osteoporosis  7/20/2022  T scores:  LS -3.2; FN -2.4; TH -2.0  (falling off curve)          2010 R wrist fx/ice skating    59 yo to follow up on multinodular thyroid.  and new osteoporosis.     Jun 19, 2020    VideoChat  [employer]  [place]  PCP:  Dr. Milly Childers          Gyn:  Dr. Presley          GI:  at [facility]           Derm:  Samuel Behren for basal cell nose  - Dr. Alexandra   and Dr. Silva  CC:  Multinodular thyroid - euthyroid:       6/2019    TSH 0.87 (on no Rx)         (bipolar - Dr. 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 Knew Steven [place].           Impression: History of multinodular thyroid, osteopenia.  Plan: Updated US thyroid  Dr. Presley requested BD.  Call me one week after US. ROV 6 months.    Previous notes from [employer] appended below.   Reason for Appointment  1. Euthyroid Multinodular goiter  2. Osteopenia      History of Present Illness  General:          PMhx:        Left retinal hemorrhage with multiple surgeries, Bipolar disorder        Was seeing [facility] and found to have multinodular goiter.         2/11 US        many sub cm thyroid nodules BL. Larges in RUP 8mm and 5mm. 8mm nodule isoechoic with thin halo. Prior US in 2008 similar findings.         No CP, SOB, No neck pain, no swelling. Ex smoker. Feb 24,1996. No radiation exposure to neck. No abdominal pain. No Constipation or diarrhea. Hx of fibroids. No hair loss or brittle nails.         Family Hx of mother with Graves disease. Mothers cousin with thyroid cancer. Brother does not have thyroid issues        Osteopenia Hx: Vitamin D 600iu daily but no longer on. No recent falls or fractures. Oct 2009 Right wrist fracture. No hx of kidney stones. Menapause in early 50's. No hot flashes.         2012 Blood work        TSH 0.74        Vitamin D 27        Vitamin B12 860        TG 84, Chol 226, , HDL 70        CMP. K 4.6, Na 145, Cr 0.6, eGFR >60, LFTs nl.         Hgb 13.1, Hct 38.    Vital Signs  Ht 62, Wt 166.8, /80, BMI 30.50.    Examination  DMS Exam:        General Pleasant  F, NAD, Nontoxic. No evidence of abuse or neglect, Not at risk for fall.         Head Normocephalic and atraumatic.         Eyes PERRL/EOM intact, Conjunctiva and sclera clear without nystagmus.         Ears TM's intact and clear with normal canals with grossly normal hearing.         Nose No deformity, No discharge, No inflammation, No lesions.         Mouth No deformity or lesions with good dentition.         Neck No masses, thyromegaly or abnormal cervical codes.         Lungs CTA BL.         Heart S1S2 RRR no R/M/G.         Skin Intact without lesions or rashes.      Assessments  1. Osteopenia - M85.80 (Primary)  2. Multinodular goiter - E04.2  3. Vitamin D deficiency - E55.9    Treatment  1. Osteopenia        LAB: VITAMIN B12 AND FOLATE      LAB: CBC      LAB: COMPREHENSIVE METABOLIC PANEL      LAB: LIPID PANEL      LAB: MAGNESIUM      LAB: PHOSPHORUS INORGANIC      LAB: VITAMIN D 25-HYDROXY      LAB: PTH INTACT      IMAGING: DEXA Notes: Hx of Osteopenia in notes Will obtain DEXA scan. Last scan appears to have been in [id number] Will check CMP, Mag, Phos, PTH, Vitamin D, Vitamin B12 and Folic acid Once blood work returns will discuss Vitamin D and calcium replacement.      2. Multinodular goiter        LAB: FREE THYROXINE FT4      LAB: HEMOGLOBIN A1c      LAB: THYROPEROXIDASE AB, SERUM      LAB: TSH      LAB: THYROGLOBULIN ANTIBODY GROUP      IMAGING: US THYROID Notes: Multinodular goiter Last thyroid US as above Will repeat to evaluate nodules Will check TSH and Free T4 Will check TPO and TG abs.

## 2024-08-07 ENCOUNTER — APPOINTMENT (OUTPATIENT)
Dept: FAMILY MEDICINE | Facility: CLINIC | Age: 64
End: 2024-08-07

## 2024-08-07 PROCEDURE — G2211 COMPLEX E/M VISIT ADD ON: CPT

## 2024-08-07 PROCEDURE — 99214 OFFICE O/P EST MOD 30 MIN: CPT

## 2024-08-08 PROBLEM — L03.90 CELLULITIS: Status: ACTIVE | Noted: 2024-08-08

## 2024-08-15 NOTE — HISTORY OF PRESENT ILLNESS
[de-identified] : amox for cellulitis, Now with much improvement Patient is admitting some knee pain

## 2024-08-15 NOTE — HEALTH RISK ASSESSMENT
[No] : No [1 or 2 (0 pts)] : 1 or 2 (0 points) [Never (0 pts)] : Never (0 points) [0] : 2) Feeling down, depressed, or hopeless: Not at all (0) [PHQ-2 Negative - No further assessment needed] : PHQ-2 Negative - No further assessment needed [Never] : Never [[id number]] : 0

## 2024-08-15 NOTE — HISTORY OF PRESENT ILLNESS
[de-identified] : amox for cellulitis, Now with much improvement Patient is admitting some knee pain

## 2024-08-15 NOTE — HEALTH RISK ASSESSMENT
[No] : No [1 or 2 (0 pts)] : 1 or 2 (0 points) [Never (0 pts)] : Never (0 points) [0] : 2) Feeling down, depressed, or hopeless: Not at all (0) [PHQ-2 Negative - No further assessment needed] : PHQ-2 Negative - No further assessment needed [Never] : Never [[id number]] : 0

## 2024-09-10 ENCOUNTER — RX RENEWAL (OUTPATIENT)
Age: 64
End: 2024-09-10

## 2024-09-25 ENCOUNTER — APPOINTMENT (OUTPATIENT)
Dept: VASCULAR SURGERY | Facility: CLINIC | Age: 64
End: 2024-09-25

## 2024-09-28 ENCOUNTER — APPOINTMENT (OUTPATIENT)
Dept: FAMILY MEDICINE | Facility: CLINIC | Age: 64
End: 2024-09-28
Payer: MEDICARE

## 2024-09-28 VITALS
HEART RATE: 97 BPM | WEIGHT: 166 LBS | DIASTOLIC BLOOD PRESSURE: 92 MMHG | OXYGEN SATURATION: 96 % | HEIGHT: 61 IN | BODY MASS INDEX: 31.34 KG/M2 | RESPIRATION RATE: 16 BRPM | SYSTOLIC BLOOD PRESSURE: 142 MMHG

## 2024-09-28 DIAGNOSIS — E78.5 HYPERLIPIDEMIA, UNSPECIFIED: ICD-10-CM

## 2024-09-28 DIAGNOSIS — I10 ESSENTIAL (PRIMARY) HYPERTENSION: ICD-10-CM

## 2024-09-28 PROCEDURE — G2211 COMPLEX E/M VISIT ADD ON: CPT

## 2024-09-28 PROCEDURE — 99203 OFFICE O/P NEW LOW 30 MIN: CPT

## 2024-10-01 NOTE — HEALTH RISK ASSESSMENT
[No] : No [1 or 2 (0 pts)] : 1 or 2 (0 points) [Never (0 pts)] : Never (0 points) [0] : 2) Feeling down, depressed, or hopeless: Not at all (0) [PHQ-2 Negative - No further assessment needed] : PHQ-2 Negative - No further assessment needed [Never] : Never [Audit-CScore] : 0 [[id number]] : 0

## 2024-10-01 NOTE — HEALTH RISK ASSESSMENT
[No] : No [1 or 2 (0 pts)] : 1 or 2 (0 points) [Never (0 pts)] : Never (0 points) [0] : 2) Feeling down, depressed, or hopeless: Not at all (0) [PHQ-2 Negative - No further assessment needed] : PHQ-2 Negative - No further assessment needed [Never] : Never [Audit-CScore] : 0 [[id number]] : 0

## 2024-10-03 ENCOUNTER — RX RENEWAL (OUTPATIENT)
Age: 64
End: 2024-10-03

## 2024-10-04 LAB
ALBUMIN SERPL ELPH-MCNC: 4.3 G/DL
ALP BLD-CCNC: 62 U/L
ALT SERPL-CCNC: 14 U/L
ANION GAP SERPL CALC-SCNC: 15 MMOL/L
AST SERPL-CCNC: 13 U/L
BILIRUB SERPL-MCNC: 0.3 MG/DL
BUN SERPL-MCNC: 20 MG/DL
CALCIUM SERPL-MCNC: 9.7 MG/DL
CHLORIDE SERPL-SCNC: 100 MMOL/L
CO2 SERPL-SCNC: 27 MMOL/L
CREAT SERPL-MCNC: 0.69 MG/DL
EGFR: 96 ML/MIN/1.73M2
GLUCOSE SERPL-MCNC: 111 MG/DL
POTASSIUM SERPL-SCNC: 4.6 MMOL/L
PROT SERPL-MCNC: 7.2 G/DL
SODIUM SERPL-SCNC: 142 MMOL/L

## 2024-10-05 LAB
CHOLEST SERPL-MCNC: 218 MG/DL
HDLC SERPL-MCNC: 75 MG/DL
LDLC SERPL CALC-MCNC: 122 MG/DL
NONHDLC SERPL-MCNC: 143 MG/DL
TRIGL SERPL-MCNC: 121 MG/DL

## 2024-10-07 ENCOUNTER — RX RENEWAL (OUTPATIENT)
Age: 64
End: 2024-10-07

## 2024-10-21 ENCOUNTER — NON-APPOINTMENT (OUTPATIENT)
Age: 64
End: 2024-10-21

## 2024-10-28 ENCOUNTER — NON-APPOINTMENT (OUTPATIENT)
Age: 64
End: 2024-10-28

## 2024-11-02 DIAGNOSIS — D64.9 ANEMIA, UNSPECIFIED: ICD-10-CM

## 2024-11-02 DIAGNOSIS — R70.0 ELEVATED ERYTHROCYTE SEDIMENTATION RATE: ICD-10-CM

## 2024-11-04 RX ORDER — TERIPARATIDE 250 UG/ML
620 INJECTION, SOLUTION SUBCUTANEOUS
Qty: 1 | Refills: 11 | Status: ACTIVE | COMMUNITY
Start: 2024-11-02 | End: 1900-01-01

## 2024-11-06 ENCOUNTER — NON-APPOINTMENT (OUTPATIENT)
Age: 64
End: 2024-11-06

## 2024-11-14 ENCOUNTER — RESULT REVIEW (OUTPATIENT)
Age: 64
End: 2024-11-14

## 2024-12-03 ENCOUNTER — APPOINTMENT (OUTPATIENT)
Dept: ENDOCRINOLOGY | Facility: CLINIC | Age: 64
End: 2024-12-03
Payer: COMMERCIAL

## 2024-12-03 DIAGNOSIS — M81.8 OTHER OSTEOPOROSIS W/OUT CURRENT PATHOLOGICAL FRACTURE: ICD-10-CM

## 2024-12-03 DIAGNOSIS — E04.2 NONTOXIC MULTINODULAR GOITER: ICD-10-CM

## 2024-12-03 PROCEDURE — 99215 OFFICE O/P EST HI 40 MIN: CPT

## 2025-01-27 ENCOUNTER — APPOINTMENT (OUTPATIENT)
Dept: FAMILY MEDICINE | Facility: CLINIC | Age: 65
End: 2025-01-27
Payer: MEDICARE

## 2025-01-27 VITALS
RESPIRATION RATE: 16 BRPM | BODY MASS INDEX: 34.78 KG/M2 | OXYGEN SATURATION: 97 % | SYSTOLIC BLOOD PRESSURE: 163 MMHG | DIASTOLIC BLOOD PRESSURE: 102 MMHG | HEIGHT: 61 IN | HEART RATE: 86 BPM | WEIGHT: 184.25 LBS

## 2025-01-27 DIAGNOSIS — E78.5 HYPERLIPIDEMIA, UNSPECIFIED: ICD-10-CM

## 2025-01-27 DIAGNOSIS — E66.9 OBESITY, UNSPECIFIED: ICD-10-CM

## 2025-01-27 DIAGNOSIS — F31.9 BIPOLAR DISORDER, UNSPECIFIED: ICD-10-CM

## 2025-01-27 DIAGNOSIS — I10 ESSENTIAL (PRIMARY) HYPERTENSION: ICD-10-CM

## 2025-01-27 PROCEDURE — 99214 OFFICE O/P EST MOD 30 MIN: CPT

## 2025-01-27 PROCEDURE — G2211 COMPLEX E/M VISIT ADD ON: CPT

## 2025-02-07 LAB
25(OH)D3 SERPL-MCNC: 41.4 NG/ML
ALBUMIN SERPL ELPH-MCNC: 4.6 G/DL
ALP BLD-CCNC: 67 U/L
ALT SERPL-CCNC: 19 U/L
ANION GAP SERPL CALC-SCNC: 13 MMOL/L
AST SERPL-CCNC: 19 U/L
BILIRUB SERPL-MCNC: 0.3 MG/DL
BUN SERPL-MCNC: 15 MG/DL
CALCIUM SERPL-MCNC: 9.6 MG/DL
CHLORIDE SERPL-SCNC: 96 MMOL/L
CHOLEST SERPL-MCNC: 181 MG/DL
CO2 SERPL-SCNC: 28 MMOL/L
CREAT SERPL-MCNC: 0.72 MG/DL
EGFR: 93 ML/MIN/1.73M2
ESTIMATED AVERAGE GLUCOSE: 114 MG/DL
GLUCOSE SERPL-MCNC: 85 MG/DL
HBA1C MFR BLD HPLC: 5.6 %
HCT VFR BLD CALC: 43.2 %
HDLC SERPL-MCNC: 93 MG/DL
HGB BLD-MCNC: 13.8 G/DL
LDLC SERPL CALC-MCNC: 78 MG/DL
MCHC RBC-ENTMCNC: 29.2 PG
MCHC RBC-ENTMCNC: 31.9 G/DL
MCV RBC AUTO: 91.5 FL
NONHDLC SERPL-MCNC: 88 MG/DL
PLATELET # BLD AUTO: 259 K/UL
POTASSIUM SERPL-SCNC: 4.2 MMOL/L
PROT SERPL-MCNC: 7.5 G/DL
RBC # BLD: 4.72 M/UL
RBC # FLD: 12.9 %
SODIUM SERPL-SCNC: 137 MMOL/L
TRIGL SERPL-MCNC: 49 MG/DL
TSH SERPL-ACNC: 0.7 UIU/ML
WBC # FLD AUTO: 4.71 K/UL

## 2025-02-15 DIAGNOSIS — Z87.442 PERSONAL HISTORY OF URINARY CALCULI: ICD-10-CM

## 2025-02-15 DIAGNOSIS — Z86.39 PERSONAL HISTORY OF OTHER ENDOCRINE, NUTRITIONAL AND METABOLIC DISEASE: ICD-10-CM

## 2025-02-15 DIAGNOSIS — E87.1 HYPO-OSMOLALITY AND HYPONATREMIA: ICD-10-CM

## 2025-02-15 DIAGNOSIS — Z86.2 PERSONAL HISTORY OF DISEASES OF THE BLOOD AND BLOOD-FORMING ORGANS AND CERTAIN DISORDERS INVOLVING THE IMMUNE MECHANISM: ICD-10-CM

## 2025-02-15 DIAGNOSIS — E66.3 OVERWEIGHT: ICD-10-CM

## 2025-02-15 DIAGNOSIS — Z86.79 PERSONAL HISTORY OF OTHER DISEASES OF THE CIRCULATORY SYSTEM: ICD-10-CM

## 2025-02-15 DIAGNOSIS — Z86.59 PERSONAL HISTORY OF OTHER MENTAL AND BEHAVIORAL DISORDERS: ICD-10-CM

## 2025-02-15 DIAGNOSIS — Z87.39 PERSONAL HISTORY OF OTHER DISEASES OF THE MUSCULOSKELETAL SYSTEM AND CONNECTIVE TISSUE: ICD-10-CM

## 2025-02-25 ENCOUNTER — NON-APPOINTMENT (OUTPATIENT)
Age: 65
End: 2025-02-25

## 2025-02-25 ENCOUNTER — APPOINTMENT (OUTPATIENT)
Dept: CARDIOLOGY | Facility: CLINIC | Age: 65
End: 2025-02-25
Payer: MEDICARE

## 2025-02-25 VITALS
SYSTOLIC BLOOD PRESSURE: 177 MMHG | OXYGEN SATURATION: 96 % | HEART RATE: 84 BPM | WEIGHT: 180 LBS | HEIGHT: 61 IN | BODY MASS INDEX: 33.99 KG/M2 | DIASTOLIC BLOOD PRESSURE: 91 MMHG

## 2025-02-25 DIAGNOSIS — Z86.39 PERSONAL HISTORY OF OTHER ENDOCRINE, NUTRITIONAL AND METABOLIC DISEASE: ICD-10-CM

## 2025-02-25 DIAGNOSIS — Z86.79 PERSONAL HISTORY OF OTHER DISEASES OF THE CIRCULATORY SYSTEM: ICD-10-CM

## 2025-02-25 DIAGNOSIS — Z87.2 PERSONAL HISTORY OF DISEASES OF THE SKIN AND SUBCUTANEOUS TISSUE: ICD-10-CM

## 2025-02-25 DIAGNOSIS — E66.3 OVERWEIGHT: ICD-10-CM

## 2025-02-25 DIAGNOSIS — I10 ESSENTIAL (PRIMARY) HYPERTENSION: ICD-10-CM

## 2025-02-25 DIAGNOSIS — E78.5 HYPERLIPIDEMIA, UNSPECIFIED: ICD-10-CM

## 2025-02-25 DIAGNOSIS — Z86.2 PERSONAL HISTORY OF DISEASES OF THE BLOOD AND BLOOD-FORMING ORGANS AND CERTAIN DISORDERS INVOLVING THE IMMUNE MECHANISM: ICD-10-CM

## 2025-02-25 DIAGNOSIS — Z86.59 PERSONAL HISTORY OF OTHER MENTAL AND BEHAVIORAL DISORDERS: ICD-10-CM

## 2025-02-25 PROCEDURE — 93000 ELECTROCARDIOGRAM COMPLETE: CPT

## 2025-02-25 PROCEDURE — 99205 OFFICE O/P NEW HI 60 MIN: CPT

## 2025-02-25 RX ORDER — SPIRONOLACTONE 25 MG/1
25 TABLET ORAL
Qty: 90 | Refills: 3 | Status: ACTIVE | COMMUNITY
Start: 2025-02-25 | End: 1900-01-01

## 2025-02-25 RX ORDER — TRAZODONE HYDROCHLORIDE 300 MG/1
TABLET ORAL
Refills: 0 | Status: ACTIVE | COMMUNITY

## 2025-02-25 RX ORDER — VITAMIN E 268 MG
CAPSULE ORAL
Refills: 0 | Status: ACTIVE | COMMUNITY

## 2025-03-02 PROBLEM — Z87.2 HISTORY OF CELLULITIS: Status: RESOLVED | Noted: 2025-03-02 | Resolved: 2025-03-02

## 2025-03-02 PROBLEM — Z86.59 HISTORY OF ANXIETY: Status: RESOLVED | Noted: 2025-03-02 | Resolved: 2025-03-02

## 2025-03-02 PROBLEM — Z86.2 HISTORY OF SARCOIDOSIS: Status: RESOLVED | Noted: 2025-03-02 | Resolved: 2025-03-02

## 2025-03-02 PROBLEM — Z86.79 HISTORY OF STASIS DERMATITIS: Status: RESOLVED | Noted: 2025-03-02 | Resolved: 2025-03-02

## 2025-03-02 PROBLEM — Z86.39 HISTORY OF HYPERPARATHYROIDISM: Status: RESOLVED | Noted: 2025-03-02 | Resolved: 2025-03-02

## 2025-03-04 ENCOUNTER — APPOINTMENT (OUTPATIENT)
Dept: BARIATRICS/WEIGHT MGMT | Facility: CLINIC | Age: 65
End: 2025-03-04

## 2025-03-05 ENCOUNTER — APPOINTMENT (OUTPATIENT)
Dept: CARDIOLOGY | Facility: CLINIC | Age: 65
End: 2025-03-05

## 2025-03-10 ENCOUNTER — APPOINTMENT (OUTPATIENT)
Dept: ORTHOPEDIC SURGERY | Facility: CLINIC | Age: 65
End: 2025-03-10

## 2025-03-12 ENCOUNTER — APPOINTMENT (OUTPATIENT)
Dept: CARDIOLOGY | Facility: CLINIC | Age: 65
End: 2025-03-12
Payer: MEDICARE

## 2025-03-12 PROCEDURE — 36415 COLL VENOUS BLD VENIPUNCTURE: CPT

## 2025-04-08 ENCOUNTER — APPOINTMENT (OUTPATIENT)
Dept: CARDIOLOGY | Facility: CLINIC | Age: 65
End: 2025-04-08
Payer: MEDICARE

## 2025-04-08 VITALS
DIASTOLIC BLOOD PRESSURE: 99 MMHG | WEIGHT: 170 LBS | OXYGEN SATURATION: 97 % | HEART RATE: 93 BPM | SYSTOLIC BLOOD PRESSURE: 197 MMHG | BODY MASS INDEX: 32.12 KG/M2

## 2025-04-08 DIAGNOSIS — E78.5 HYPERLIPIDEMIA, UNSPECIFIED: ICD-10-CM

## 2025-04-08 DIAGNOSIS — I10 ESSENTIAL (PRIMARY) HYPERTENSION: ICD-10-CM

## 2025-04-08 DIAGNOSIS — E66.3 OVERWEIGHT: ICD-10-CM

## 2025-04-08 PROCEDURE — G2211 COMPLEX E/M VISIT ADD ON: CPT

## 2025-04-08 PROCEDURE — 99213 OFFICE O/P EST LOW 20 MIN: CPT

## 2025-04-08 RX ORDER — SPIRONOLACTONE 25 MG/1
25 TABLET ORAL
Qty: 90 | Refills: 3 | Status: ACTIVE | COMMUNITY
Start: 2025-04-08 | End: 1900-01-01

## 2025-04-13 RX ORDER — LAMOTRIGINE 150 MG/1
TABLET ORAL
Refills: 0 | Status: ACTIVE | COMMUNITY

## 2025-04-20 PROBLEM — R79.89 ELEVATED PARATHYROID HORMONE: Status: ACTIVE | Noted: 2025-04-20

## 2025-04-30 ENCOUNTER — APPOINTMENT (OUTPATIENT)
Dept: FAMILY MEDICINE | Facility: CLINIC | Age: 65
End: 2025-04-30

## 2025-06-04 ENCOUNTER — RESULT REVIEW (OUTPATIENT)
Age: 65
End: 2025-06-04

## 2025-06-06 ENCOUNTER — APPOINTMENT (OUTPATIENT)
Dept: INTERNAL MEDICINE | Facility: CLINIC | Age: 65
End: 2025-06-06

## 2025-06-16 ENCOUNTER — APPOINTMENT (OUTPATIENT)
Dept: FAMILY MEDICINE | Facility: CLINIC | Age: 65
End: 2025-06-16
Payer: MEDICARE

## 2025-06-16 PROCEDURE — 36415 COLL VENOUS BLD VENIPUNCTURE: CPT

## 2025-06-17 ENCOUNTER — APPOINTMENT (OUTPATIENT)
Dept: ENDOCRINOLOGY | Facility: CLINIC | Age: 65
End: 2025-06-17
Payer: COMMERCIAL

## 2025-06-17 PROCEDURE — 99215 OFFICE O/P EST HI 40 MIN: CPT | Mod: 95

## 2025-06-18 ENCOUNTER — APPOINTMENT (OUTPATIENT)
Dept: FAMILY MEDICINE | Facility: CLINIC | Age: 65
End: 2025-06-18
Payer: MEDICARE

## 2025-06-18 VITALS
DIASTOLIC BLOOD PRESSURE: 88 MMHG | SYSTOLIC BLOOD PRESSURE: 139 MMHG | HEART RATE: 77 BPM | BODY MASS INDEX: 31.53 KG/M2 | WEIGHT: 167 LBS | OXYGEN SATURATION: 97 % | TEMPERATURE: 97.7 F | HEIGHT: 61 IN | RESPIRATION RATE: 16 BRPM

## 2025-06-18 LAB
25(OH)D3 SERPL-MCNC: 56.5 NG/ML
ALBUMIN SERPL ELPH-MCNC: 4.2 G/DL
ALP BLD-CCNC: 60 U/L
ALT SERPL-CCNC: 20 U/L
ANION GAP SERPL CALC-SCNC: 13 MMOL/L
AST SERPL-CCNC: 18 U/L
BILIRUB SERPL-MCNC: 0.3 MG/DL
BUN SERPL-MCNC: 21 MG/DL
CALCIUM SERPL-MCNC: 9.7 MG/DL
CHLORIDE SERPL-SCNC: 97 MMOL/L
CHOLEST SERPL-MCNC: 194 MG/DL
CO2 SERPL-SCNC: 26 MMOL/L
CREAT SERPL-MCNC: 0.67 MG/DL
EGFRCR SERPLBLD CKD-EPI 2021: 97 ML/MIN/1.73M2
ESTIMATED AVERAGE GLUCOSE: 120 MG/DL
GLUCOSE SERPL-MCNC: 103 MG/DL
HBA1C MFR BLD HPLC: 5.8 %
HCT VFR BLD CALC: 43.8 %
HDLC SERPL-MCNC: 62 MG/DL
HGB BLD-MCNC: 13.5 G/DL
LDLC SERPL-MCNC: 115 MG/DL
MCHC RBC-ENTMCNC: 29.3 PG
MCHC RBC-ENTMCNC: 30.8 G/DL
MCV RBC AUTO: 95 FL
NONHDLC SERPL-MCNC: 131 MG/DL
PARATHYROID HORMONE INTACT: 39 PG/ML
PLATELET # BLD AUTO: 270 K/UL
POTASSIUM SERPL-SCNC: 4.7 MMOL/L
PROT SERPL-MCNC: 7 G/DL
RBC # BLD: 4.61 M/UL
RBC # FLD: 13.7 %
SODIUM SERPL-SCNC: 136 MMOL/L
T3FREE SERPL-MCNC: 3.73 PG/ML
T4 FREE SERPL-MCNC: 1.3 NG/DL
TRIGL SERPL-MCNC: 90 MG/DL
TSH SERPL-ACNC: 0.83 UIU/ML
WBC # FLD AUTO: 5.85 K/UL

## 2025-06-18 PROCEDURE — G0402 INITIAL PREVENTIVE EXAM: CPT

## 2025-06-18 PROCEDURE — G0439: CPT

## 2025-08-26 ENCOUNTER — RX RENEWAL (OUTPATIENT)
Age: 65
End: 2025-08-26